# Patient Record
Sex: MALE | Race: BLACK OR AFRICAN AMERICAN | Employment: UNEMPLOYED | URBAN - METROPOLITAN AREA
[De-identification: names, ages, dates, MRNs, and addresses within clinical notes are randomized per-mention and may not be internally consistent; named-entity substitution may affect disease eponyms.]

---

## 2017-01-05 ENCOUNTER — OFFICE VISIT (OUTPATIENT)
Dept: INTERNAL MEDICINE CLINIC | Facility: CLINIC | Age: 52
End: 2017-01-05

## 2017-01-05 VITALS
BODY MASS INDEX: 36.67 KG/M2 | DIASTOLIC BLOOD PRESSURE: 92 MMHG | RESPIRATION RATE: 22 BRPM | HEART RATE: 78 BPM | TEMPERATURE: 98 F | WEIGHT: 267.81 LBS | HEIGHT: 71.75 IN | SYSTOLIC BLOOD PRESSURE: 166 MMHG | OXYGEN SATURATION: 98 %

## 2017-01-05 DIAGNOSIS — I48.91 ATRIAL FIBRILLATION WITH RVR (HCC): ICD-10-CM

## 2017-01-05 DIAGNOSIS — I50.31 CHF (CONGESTIVE HEART FAILURE), NYHA CLASS I, ACUTE, DIASTOLIC (HCC): ICD-10-CM

## 2017-01-05 DIAGNOSIS — I27.20 PULMONARY HTN (HCC): Primary | ICD-10-CM

## 2017-01-05 DIAGNOSIS — I10 ESSENTIAL HYPERTENSION: ICD-10-CM

## 2017-01-05 PROCEDURE — 99213 OFFICE O/P EST LOW 20 MIN: CPT | Performed by: INTERNAL MEDICINE

## 2017-01-05 PROCEDURE — 99215 OFFICE O/P EST HI 40 MIN: CPT | Performed by: INTERNAL MEDICINE

## 2017-01-05 RX ORDER — POTASSIUM CHLORIDE 20 MEQ/1
TABLET, EXTENDED RELEASE ORAL
Refills: 1 | COMMUNITY
Start: 2016-12-28 | End: 2017-02-01

## 2017-01-05 RX ORDER — DILTIAZEM HYDROCHLORIDE 360 MG/1
360 CAPSULE, EXTENDED RELEASE ORAL DAILY
Qty: 30 CAPSULE | Refills: 3 | Status: SHIPPED | OUTPATIENT
Start: 2017-01-05 | End: 2017-02-01

## 2017-01-06 NOTE — ASSESSMENT & PLAN NOTE
Continues to be in atrial fibrillation but rate is controlled. Continue on the Coreg, diltiazem as well as Xarelto. Bleeding precautions discussed. Advised to complete an EKG for a follow-up evaluation.

## 2017-01-06 NOTE — ASSESSMENT & PLAN NOTE
Blood pressure 166/92, pulse 78, temperature 98.1 °F (36.7 °C), temperature source Oral, resp. rate 22, height 5' 11.75\" (1.822 m), weight 267 lb 12.8 oz (121.473 kg), SpO2 98 %. The blood pressure remains quite elevated.   Rechecked and was about the [facility]

## 2017-01-06 NOTE — PATIENT INSTRUCTIONS
Problem List Items Addressed This Visit        Unprioritized    Atrial fibrillation with RVR ([address]     Continues to be in atrial fibrillation but rate is controlled. Continue on the Coreg, diltiazem as well as Xarelto. Bleeding precautions discussed.   Ad mg 1-1/2 tablets at bedtime, Coreg at 25 mg 2 times daily and Lasix at 40 mg once daily. Recent renal functions were stable–EGFR at 60 but creatinine gradually rising at 1.32.   Patient has been advised to drink fluids to keep well hydrated as he does work

## 2017-01-06 NOTE — ASSESSMENT & PLAN NOTE
Recent admission to the hospital with lower extremity edema–was found to have atrial fibrillation with rapid ventricular rhythm. He was stabilized on diltiazem and Coreg. Blood pressures continue to be elevated and is being treated at this time.   He has

## 2017-01-06 NOTE — ASSESSMENT & PLAN NOTE
Pulmonary pressures checked in the hospital at this visit was at 46. This is quite elevated compared to the last test that we have had in the past when it was at about 40. Patient does use a CPAP.   Current elevation was most likely secondary to congestiv

## 2017-01-06 NOTE — PROGRESS NOTES
HPI:    Patient ID: Kacy Hernandez is a 49year old male.     HPI Comments: Per chf clinic    CARE TRANSITION SUMMARY NOTE:  Patient was admitted to the hospital on 11/25/2016 and discharged 11/26/2016.      While hospitalized consults included Cardiology mouth daily for hypertension and for heart rate control, Lasix 40 mg by mouth daily for fluid volume management, and multivitamin 1 tablet by mouth daily as a supplement.      Dictated By Joel Mares.  [address], APN  d:     11/29/2016 10:36:18  t:      11/29/201 exposure. Past treatments include ACE inhibitors, beta blockers, diuretics and lifestyle changes. The current treatment provides moderate improvement. Compliance problems include diet and exercise.   Hypertensive end-organ damage includes heart failure and tablet (25 mg total) by mouth 2 (two) times daily with meals. Disp: 60 tablet Rfl: 0   furosemide 40 MG Oral Tab Take 1 tablet (40 mg total) by mouth daily. Disp: 30 tablet Rfl: 0   lisinopril 10 MG Oral Tab Take 1.5 tablets (15 mg total) by mouth nightly. present. Left: No inguinal adenopathy present. Neurological: He is alert and oriented to person, place, and time. He has normal reflexes. Skin: Skin is warm and dry. Nursing note and vitals reviewed.              ASSESSMENT/PLAN:   Pulmonary ht medications. Consider quitting smoking as soon as is possible. HTN (hypertension)     Blood pressure 166/92, pulse 78, temperature 98.1 °F (36.7 °C), temperature source Oral, resp.  rate 22, height 5' 11.75\" (1.822 m), weight 267 lb 12.8 oz (121.4 Sig: Take 1 capsule (360 mg total) by mouth daily.            Imaging & Referrals:  EKG 12-LEAD       [id number]

## 2017-01-09 ENCOUNTER — OFFICE VISIT (OUTPATIENT)
Dept: CARDIOLOGY CLINIC | Facility: HOSPITAL | Age: 52
End: 2017-01-09
Attending: INTERNAL MEDICINE
Payer: COMMERCIAL

## 2017-01-09 VITALS
RESPIRATION RATE: 17 BRPM | BODY MASS INDEX: 37 KG/M2 | WEIGHT: 272 LBS | OXYGEN SATURATION: 98 % | DIASTOLIC BLOOD PRESSURE: 118 MMHG | SYSTOLIC BLOOD PRESSURE: 182 MMHG | HEART RATE: 82 BPM

## 2017-01-09 DIAGNOSIS — I50.9 CONGESTIVE HEART FAILURE, UNSPECIFIED: Primary | ICD-10-CM

## 2017-01-09 LAB
ANION GAP SERPL CALC-SCNC: 9 MMOL/L (ref 0–18)
BUN SERPL-MCNC: 17 MG/DL (ref 8–20)
BUN/CREAT SERPL: 12.3 (ref 10–20)
CALCIUM SERPL-MCNC: 9.4 MG/DL (ref 8.5–10.5)
CHLORIDE SERPL-SCNC: 108 MMOL/L (ref 95–110)
CO2 SERPL-SCNC: 26 MMOL/L (ref 22–32)
CREAT SERPL-MCNC: 1.38 MG/DL (ref 0.5–1.5)
GLUCOSE SERPL-MCNC: 119 MG/DL (ref 70–99)
OSMOLALITY UR CALC.SUM OF ELEC: 299 MOSM/KG (ref 275–295)
POTASSIUM SERPL-SCNC: 4.1 MMOL/L (ref 3.3–5.1)
SODIUM SERPL-SCNC: 143 MMOL/L (ref 136–144)

## 2017-01-09 PROCEDURE — 36415 COLL VENOUS BLD VENIPUNCTURE: CPT | Performed by: NURSE PRACTITIONER

## 2017-01-09 PROCEDURE — 99211 OFF/OP EST MAY X REQ PHY/QHP: CPT | Performed by: NURSE PRACTITIONER

## 2017-01-09 PROCEDURE — 80048 BASIC METABOLIC PNL TOTAL CA: CPT | Performed by: NURSE PRACTITIONER

## 2017-01-09 PROCEDURE — 99213 OFFICE O/P EST LOW 20 MIN: CPT | Performed by: NURSE PRACTITIONER

## 2017-01-09 NOTE — PROGRESS NOTES
Heart Failure [facility]. Patient Status:  Outpatient    1965 MRN [id number]   Location MD Aimee Gabriel MD Hetty Ao is a 49year old history of HTN nightly. No CP. No dizziness. No syncope. No palpitations. Denies peripheral edema. Last 3 days the patient has felt a little bloated. Patient reports doing more home cooking and watching his Na intake better. Patient feels he is at around 64 fl oz daily.  H AM   TROP 0.03 11/25/2016 07:35 AM       General appearance: alert, appears stated age and cooperative  Neck: no JVD and supple, symmetrical, trachea midline  Lungs: mildly diminished throughout. Clear.    Chest wall: no tenderness  Heart: S1, S2 normal, ir Eusebio Wall [phone]     Things for You to Remember:     -Please weigh yourself daily in the morning before breakfast and call with 3 lb weight gain overnight or 5 lb weight gain in 1 week. -Eat low salt foods- a maximum sodium intake of 2000 mg daily.  C

## 2017-01-09 NOTE — PATIENT INSTRUCTIONS
Continue all of your present medications.  Please call in the future if needed    Please make appointment with Dr Eduarda Horowitz [phone]     Things for You to Remember:     -Please weigh yourself daily in the morning before breakfast and call with 3 lb weight

## 2017-01-31 NOTE — TELEPHONE ENCOUNTER
[address], [phone], [phone]    Current Outpatient Prescriptions:  Potassium Chloride ER 20 MEQ Oral Tab CR TK 1 T PO  D Disp:  Rfl: 1   DiltiaZEM HCl ER Beads 360

## 2017-02-01 RX ORDER — DILTIAZEM HYDROCHLORIDE 360 MG/1
360 CAPSULE, EXTENDED RELEASE ORAL DAILY
Qty: 30 CAPSULE | Refills: 5 | Status: SHIPPED | OUTPATIENT
Start: 2017-02-01 | End: 2018-03-03

## 2017-02-01 RX ORDER — LISINOPRIL 10 MG/1
15 TABLET ORAL NIGHTLY
Qty: 60 TABLET | Refills: 5 | Status: SHIPPED | OUTPATIENT
Start: 2017-02-01 | End: 2017-02-02

## 2017-02-01 RX ORDER — LISINOPRIL 10 MG/1
15 TABLET ORAL DAILY
COMMUNITY
End: 2017-02-01

## 2017-02-01 RX ORDER — CARVEDILOL 25 MG/1
25 TABLET ORAL 2 TIMES DAILY WITH MEALS
Qty: 60 TABLET | Refills: 5 | Status: SHIPPED | OUTPATIENT
Start: 2017-02-01 | End: 2018-01-27

## 2017-02-01 RX ORDER — POTASSIUM CHLORIDE 20 MEQ/1
TABLET, EXTENDED RELEASE ORAL
Qty: 30 TABLET | Refills: 5 | Status: SHIPPED | OUTPATIENT
Start: 2017-02-01 | End: 2017-10-09

## 2017-02-01 RX ORDER — FUROSEMIDE 40 MG/1
40 TABLET ORAL DAILY
Qty: 30 TABLET | Refills: 5 | Status: SHIPPED | OUTPATIENT
Start: 2017-02-01 | End: 2018-04-12

## 2017-02-01 NOTE — TELEPHONE ENCOUNTER
RECEIVED VERBAL ORDER FROM DR SEEMA GUERRERO TO REFILL MEDICATIONS, REFILLS SENT TO PHARMACY. APPOINTMENT SCHEDULED WITH DR STEPHENSON TOMORROW.

## 2017-02-01 NOTE — TELEPHONE ENCOUNTER
PATIENT CALLED STATES THAT HE IS COMPLETELY OUT OF MEDICATION SINCE FRIDAY AND THAT HE IS AT THE PHARMACY NOW AND WOULD LIKE MEDS REFILLED. PHONE # [phone]. LOV 1/5/17. PLEASE ADVISE.

## 2017-02-02 ENCOUNTER — OFFICE VISIT (OUTPATIENT)
Dept: INTERNAL MEDICINE CLINIC | Facility: CLINIC | Age: 52
End: 2017-02-02

## 2017-02-02 VITALS
RESPIRATION RATE: 20 BRPM | OXYGEN SATURATION: 97 % | SYSTOLIC BLOOD PRESSURE: 159 MMHG | BODY MASS INDEX: 38.41 KG/M2 | WEIGHT: 280.5 LBS | HEIGHT: 71.75 IN | DIASTOLIC BLOOD PRESSURE: 96 MMHG | HEART RATE: 83 BPM | TEMPERATURE: 98 F

## 2017-02-02 DIAGNOSIS — I48.91 ATRIAL FIBRILLATION WITH RVR (HCC): ICD-10-CM

## 2017-02-02 DIAGNOSIS — I10 ESSENTIAL HYPERTENSION: Primary | ICD-10-CM

## 2017-02-02 PROCEDURE — 99214 OFFICE O/P EST MOD 30 MIN: CPT | Performed by: INTERNAL MEDICINE

## 2017-02-02 PROCEDURE — 99212 OFFICE O/P EST SF 10 MIN: CPT | Performed by: INTERNAL MEDICINE

## 2017-02-02 RX ORDER — LOSARTAN POTASSIUM 100 MG/1
TABLET ORAL
Qty: 30 TABLET | Refills: 5 | Status: SHIPPED | OUTPATIENT
Start: 2017-02-02 | End: 2018-01-27

## 2017-02-03 NOTE — PATIENT INSTRUCTIONS
Problem List Items Addressed This Visit        Unprioritized    Atrial fibrillation with RVR ([facility])     Blood pressure 159/96, pulse 83, temperature 98.4 °F (36.9 °C), temperature source Oral, resp.  rate 20, height 5' 11.75\" (1.822 m), weight 280 lb 8 oz (

## 2017-02-18 NOTE — PROGRESS NOTES
HPI:    Patient ID: Dawood Herrera is a 49year old male. Hypertension  This is a chronic problem. The current episode started more than 1 year ago. The problem has been waxing and waning since onset. The problem is uncontrolled.  Pertinent negatives i Genitourinary: Negative. Musculoskeletal: Negative. Skin: Negative. Neurological: Negative. Psychiatric/Behavioral: Negative.              Current Outpatient Prescriptions:  losartan 100 MG Oral Tab 1 tab po q d Disp: 30 tablet Rfl: 5   furose diagnosis)  Atrial fibrillation with rvr (hcc)    Problem List Items Addressed This Visit        Unprioritized    Atrial fibrillation with RVR (HCC)     Blood pressure 159/96, pulse 83, temperature 98.4 °F (36.9 °C), temperature source Oral, resp.  rate 20, tablet 5      Si tab po q d           Imaging & Referrals:  None       [id number]

## 2017-10-09 RX ORDER — POTASSIUM CHLORIDE 20 MEQ/1
TABLET, EXTENDED RELEASE ORAL
Qty: 30 TABLET | Refills: 0 | Status: SHIPPED | OUTPATIENT
Start: 2017-10-09 | End: 2018-01-27

## 2018-01-30 RX ORDER — LOSARTAN POTASSIUM 100 MG/1
TABLET ORAL
Qty: 30 TABLET | Refills: 5 | Status: SHIPPED | OUTPATIENT
Start: 2018-01-30 | End: 2018-04-12

## 2018-01-30 RX ORDER — CARVEDILOL 25 MG/1
TABLET ORAL
Qty: 60 TABLET | Refills: 5 | Status: SHIPPED | OUTPATIENT
Start: 2018-01-30 | End: 2018-04-12

## 2018-01-30 RX ORDER — POTASSIUM CHLORIDE 20 MEQ/1
TABLET, EXTENDED RELEASE ORAL
Qty: 30 TABLET | Refills: 5 | Status: SHIPPED | OUTPATIENT
Start: 2018-01-30 | End: 2018-04-12

## 2018-02-03 RX ORDER — RIVAROXABAN 20 MG/1
TABLET, FILM COATED ORAL
Qty: 30 TABLET | Refills: 5 | Status: ON HOLD | OUTPATIENT
Start: 2018-02-03 | End: 2019-04-02

## 2018-03-04 RX ORDER — DILTIAZEM HYDROCHLORIDE 360 MG/1
CAPSULE, EXTENDED RELEASE ORAL
Qty: 30 CAPSULE | Refills: 0 | Status: SHIPPED | OUTPATIENT
Start: 2018-03-04 | End: 2018-04-12

## 2018-03-04 NOTE — TELEPHONE ENCOUNTER
Please call and schedule an appt olga lidia.please adv to complete labs before visit-cmp,lipid panel

## 2018-04-12 ENCOUNTER — TELEPHONE (OUTPATIENT)
Dept: INTERNAL MEDICINE CLINIC | Facility: CLINIC | Age: 53
End: 2018-04-12

## 2018-04-12 ENCOUNTER — OFFICE VISIT (OUTPATIENT)
Dept: INTERNAL MEDICINE CLINIC | Facility: CLINIC | Age: 53
End: 2018-04-12

## 2018-04-12 VITALS
DIASTOLIC BLOOD PRESSURE: 90 MMHG | BODY MASS INDEX: 37.51 KG/M2 | HEART RATE: 121 BPM | WEIGHT: 283 LBS | SYSTOLIC BLOOD PRESSURE: 160 MMHG | HEIGHT: 73 IN | TEMPERATURE: 98 F

## 2018-04-12 DIAGNOSIS — I10 ESSENTIAL HYPERTENSION: ICD-10-CM

## 2018-04-12 DIAGNOSIS — I48.91 ATRIAL FIBRILLATION WITH RVR (HCC): Primary | ICD-10-CM

## 2018-04-12 PROCEDURE — 99214 OFFICE O/P EST MOD 30 MIN: CPT | Performed by: INTERNAL MEDICINE

## 2018-04-12 PROCEDURE — 99212 OFFICE O/P EST SF 10 MIN: CPT | Performed by: INTERNAL MEDICINE

## 2018-04-12 RX ORDER — LOSARTAN POTASSIUM 100 MG/1
100 TABLET ORAL
Qty: 30 TABLET | Refills: 5 | Status: ON HOLD | OUTPATIENT
Start: 2018-04-12 | End: 2019-04-02

## 2018-04-12 RX ORDER — DILTIAZEM HYDROCHLORIDE 360 MG/1
CAPSULE, EXTENDED RELEASE ORAL
Qty: 30 CAPSULE | Refills: 5 | Status: ON HOLD | OUTPATIENT
Start: 2018-04-12 | End: 2019-04-02

## 2018-04-12 RX ORDER — CARVEDILOL 25 MG/1
TABLET ORAL
Qty: 60 TABLET | Refills: 5 | Status: ON HOLD | OUTPATIENT
Start: 2018-04-12 | End: 2019-04-02

## 2018-04-12 RX ORDER — FUROSEMIDE 40 MG/1
40 TABLET ORAL DAILY
Qty: 30 TABLET | Refills: 5 | Status: ON HOLD | OUTPATIENT
Start: 2018-04-12 | End: 2019-04-02

## 2018-04-12 RX ORDER — POTASSIUM CHLORIDE 20 MEQ/1
20 TABLET, EXTENDED RELEASE ORAL
Qty: 30 TABLET | Refills: 5 | Status: ON HOLD | OUTPATIENT
Start: 2018-04-12 | End: 2019-04-02

## 2018-04-13 NOTE — ASSESSMENT & PLAN NOTE
Blood pressure 160/90, pulse 121, temperature 98.1 °F (36.7 °C), temperature source Oral, height 6' 1\" (1.854 m), weight 283 lb (128.4 kg). Blood pressures remain elevated even upon recheck.   Restart on diltiazem at 360 mg 1 capsule once daily, Coreg 25

## 2018-04-13 NOTE — ASSESSMENT & PLAN NOTE
Atrial fibrillation with rapid ventricular rate. Blood pressures remain elevated. Patient has not been taking his blood pressure medications as he ran out of the medications. Refills have been provided.   Patient is advised to complete an EKG prior to th

## 2018-04-13 NOTE — PROGRESS NOTES
HPI:    Patient ID: Sol Ren is a 47year old male.     Pt has been noncompliant with f/u and labs  Patient discontinued the Xarelto about 2 months back.        Unprioritized    Atrial fibrillation with RVR ([facility])        Blood pressure 159/96, pulse anxiety, chest pain, malaise/fatigue, orthopnea, palpitations, peripheral edema, PND, shortness of breath or sweats. There are no associated agents to hypertension.  Risk factors for coronary artery disease include obesity, male gender and smoking/tobacco e Tab TAKE 1 TABLET(25 MG) BY MOUTH TWICE DAILY WITH MEALS Disp: 60 tablet Rfl: 5   losartan 100 MG Oral Tab Take 1 tablet (100 mg total) by mouth once daily.  Disp: 30 tablet Rfl: 5   Potassium Chloride ER 20 MEQ Oral Tab CR Take 1 tablet (20 mEq total) by m complete an EKG prior to the next office visit. He has not been taking the Xarelto because of the prohibitive cost.  Coupon has been provided today and will follow-up in the next 2-3 weeks.          Relevant Orders    CARD ECHO 2D DOPPLER (CPT=93306)    EK losartan 100 MG Oral Tab 30 tablet 5      Sig: Take 1 tablet (100 mg total) by mouth once daily. Potassium Chloride ER 20 MEQ Oral Tab CR 30 tablet 5      Sig: Take 1 tablet (20 mEq total) by mouth once daily.            Imaging & Referrals:  EKG 12-LE

## 2018-04-13 NOTE — PATIENT INSTRUCTIONS
Problem List Items Addressed This Visit        Unprioritized    Atrial fibrillation with RVR ([address] - Primary     Atrial fibrillation with rapid ventricular rate. Blood pressures remain elevated.   Patient has not been taking his blood pressure medications

## 2018-05-05 ENCOUNTER — HOSPITAL ENCOUNTER (OUTPATIENT)
Dept: CV DIAGNOSTICS | Facility: HOSPITAL | Age: 53
Discharge: HOME | End: 2018-05-05
Attending: INTERNAL MEDICINE
Payer: COMMERCIAL

## 2018-05-05 DIAGNOSIS — I48.91 ATRIAL FIBRILLATION WITH RVR (HCC): ICD-10-CM

## 2018-05-05 DIAGNOSIS — I10 ESSENTIAL HYPERTENSION: ICD-10-CM

## 2018-05-05 PROCEDURE — 93306 TTE W/DOPPLER COMPLETE: CPT | Performed by: INTERNAL MEDICINE

## 2018-05-06 ENCOUNTER — LAB ENCOUNTER (OUTPATIENT)
Dept: LAB | Facility: HOSPITAL | Age: 53
End: 2018-05-06
Attending: INTERNAL MEDICINE
Payer: COMMERCIAL

## 2018-05-06 DIAGNOSIS — I10 ESSENTIAL HYPERTENSION: ICD-10-CM

## 2018-05-06 PROCEDURE — 82570 ASSAY OF URINE CREATININE: CPT

## 2018-05-06 PROCEDURE — 80061 LIPID PANEL: CPT

## 2018-05-06 PROCEDURE — 80053 COMPREHEN METABOLIC PANEL: CPT

## 2018-05-06 PROCEDURE — 85025 COMPLETE CBC W/AUTO DIFF WBC: CPT

## 2018-05-06 PROCEDURE — 84443 ASSAY THYROID STIM HORMONE: CPT

## 2018-05-06 PROCEDURE — 82043 UR ALBUMIN QUANTITATIVE: CPT

## 2018-05-06 PROCEDURE — 36415 COLL VENOUS BLD VENIPUNCTURE: CPT

## 2018-05-06 PROCEDURE — 81001 URINALYSIS AUTO W/SCOPE: CPT

## 2018-07-09 NOTE — ASSESSMENT & PLAN NOTE
Blood pressure 159/96, pulse 83, temperature 98.4 °F (36.9 °C), temperature source Oral, resp. rate 20, height 5' 11.75\" (1.822 m), weight 280 lb 8 oz (127.234 kg), SpO2 97 %.    Patient is currently on diltiazem ER at 360 mg 1 capsule once daily, lisinopr
Blood pressure 159/96, pulse 83, temperature 98.4 °F (36.9 °C), temperature source Oral, resp. rate 20, height 5' 11.75\" (1.822 m), weight 280 lb 8 oz (127.234 kg), SpO2 97 %. Heart rate seems stable at this time.   Blood pressures are elevated and medic
Ambulatory

## 2019-02-15 ENCOUNTER — APPOINTMENT (OUTPATIENT)
Dept: ULTRASOUND IMAGING | Facility: HOSPITAL | Age: 54
End: 2019-02-15
Payer: COMMERCIAL

## 2019-02-15 ENCOUNTER — OFFICE VISIT (OUTPATIENT)
Dept: INTERNAL MEDICINE CLINIC | Facility: CLINIC | Age: 54
End: 2019-02-15
Payer: COMMERCIAL

## 2019-02-15 ENCOUNTER — HOSPITAL ENCOUNTER (EMERGENCY)
Facility: HOSPITAL | Age: 54
Discharge: HOME | End: 2019-02-15
Attending: EMERGENCY MEDICINE
Payer: COMMERCIAL

## 2019-02-15 VITALS
OXYGEN SATURATION: 96 % | DIASTOLIC BLOOD PRESSURE: 98 MMHG | SYSTOLIC BLOOD PRESSURE: 142 MMHG | TEMPERATURE: 99 F | WEIGHT: 280 LBS | HEIGHT: 73 IN | HEART RATE: 74 BPM | BODY MASS INDEX: 37.11 KG/M2 | RESPIRATION RATE: 22 BRPM

## 2019-02-15 VITALS
DIASTOLIC BLOOD PRESSURE: 75 MMHG | HEART RATE: 101 BPM | RESPIRATION RATE: 18 BRPM | WEIGHT: 282 LBS | HEIGHT: 73 IN | SYSTOLIC BLOOD PRESSURE: 155 MMHG | BODY MASS INDEX: 37.37 KG/M2

## 2019-02-15 DIAGNOSIS — L03.115 CELLULITIS OF RIGHT LEG: Primary | ICD-10-CM

## 2019-02-15 DIAGNOSIS — L03.115 CELLULITIS OF RIGHT LOWER EXTREMITY: Primary | ICD-10-CM

## 2019-02-15 LAB
ANION GAP SERPL CALC-SCNC: 7 MMOL/L (ref 0–18)
BASOPHILS # BLD AUTO: 0.06 X10(3) UL (ref 0–0.2)
BASOPHILS NFR BLD AUTO: 0.4 %
BUN BLD-MCNC: 17 MG/DL (ref 7–18)
BUN/CREAT SERPL: 12.3 (ref 10–20)
CALCIUM BLD-MCNC: 8.4 MG/DL (ref 8.5–10.1)
CHLORIDE SERPL-SCNC: 106 MMOL/L (ref 98–107)
CO2 SERPL-SCNC: 26 MMOL/L (ref 21–32)
CREAT BLD-MCNC: 1.38 MG/DL (ref 0.7–1.3)
DEPRECATED RDW RBC AUTO: 45.3 FL (ref 35.1–46.3)
EOSINOPHIL # BLD AUTO: 0.12 X10(3) UL (ref 0–0.7)
EOSINOPHIL NFR BLD AUTO: 0.8 %
ERYTHROCYTE [DISTWIDTH] IN BLOOD BY AUTOMATED COUNT: 14 % (ref 11–15)
GLUCOSE BLD-MCNC: 133 MG/DL (ref 70–99)
HCT VFR BLD AUTO: 38.8 % (ref 39–53)
HGB BLD-MCNC: 12.1 G/DL (ref 13–17.5)
IMM GRANULOCYTES # BLD AUTO: 0.07 X10(3) UL (ref 0–1)
IMM GRANULOCYTES NFR BLD: 0.5 %
LYMPHOCYTES # BLD AUTO: 1.94 X10(3) UL (ref 1–4)
LYMPHOCYTES NFR BLD AUTO: 13.2 %
MCH RBC QN AUTO: 27.6 PG (ref 26–34)
MCHC RBC AUTO-ENTMCNC: 31.2 G/DL (ref 31–37)
MCV RBC AUTO: 88.4 FL (ref 80–100)
MONOCYTES # BLD AUTO: 1.57 X10(3) UL (ref 0.1–1)
MONOCYTES NFR BLD AUTO: 10.7 %
NEUTROPHILS # BLD AUTO: 10.96 X10 (3) UL (ref 1.5–7.7)
NEUTROPHILS # BLD AUTO: 10.96 X10(3) UL (ref 1.5–7.7)
NEUTROPHILS NFR BLD AUTO: 74.4 %
OSMOLALITY SERPL CALC.SUM OF ELEC: 291 MOSM/KG (ref 275–295)
PLATELET # BLD AUTO: 200 10(3)UL (ref 150–450)
POTASSIUM SERPL-SCNC: 4.1 MMOL/L (ref 3.5–5.1)
RBC # BLD AUTO: 4.39 X10(6)UL (ref 4.3–5.7)
SODIUM SERPL-SCNC: 139 MMOL/L (ref 136–145)
WBC # BLD AUTO: 14.7 X10(3) UL (ref 4–11)

## 2019-02-15 PROCEDURE — 99214 OFFICE O/P EST MOD 30 MIN: CPT | Performed by: INTERNAL MEDICINE

## 2019-02-15 PROCEDURE — 85025 COMPLETE CBC W/AUTO DIFF WBC: CPT | Performed by: EMERGENCY MEDICINE

## 2019-02-15 PROCEDURE — 99284 EMERGENCY DEPT VISIT MOD MDM: CPT

## 2019-02-15 PROCEDURE — 99212 OFFICE O/P EST SF 10 MIN: CPT | Performed by: INTERNAL MEDICINE

## 2019-02-15 PROCEDURE — 93971 EXTREMITY STUDY: CPT | Performed by: EMERGENCY MEDICINE

## 2019-02-15 PROCEDURE — 36415 COLL VENOUS BLD VENIPUNCTURE: CPT

## 2019-02-15 PROCEDURE — 80048 BASIC METABOLIC PNL TOTAL CA: CPT | Performed by: EMERGENCY MEDICINE

## 2019-02-15 RX ORDER — CEPHALEXIN 500 MG/1
500 CAPSULE ORAL 4 TIMES DAILY
Qty: 12 CAPSULE | Refills: 0 | Status: SHIPPED | OUTPATIENT
Start: 2019-02-15 | End: 2019-02-18

## 2019-02-15 RX ORDER — HYDROCODONE BITARTRATE AND ACETAMINOPHEN 5; 325 MG/1; MG/1
1-2 TABLET ORAL EVERY 4 HOURS PRN
Qty: 10 TABLET | Refills: 0 | Status: SHIPPED | OUTPATIENT
Start: 2019-02-15 | End: 2019-02-22

## 2019-02-15 NOTE — ED INITIAL ASSESSMENT (HPI)
Atraumatic RLE pain, redness and swelling x2-3 days - warm to touch. Referred to to ER from Dr Tez Bo office for r/o DVT.

## 2019-02-15 NOTE — ED PROVIDER NOTES
Patient Seen in: [facility] AND [facility] Emergency Department    History   Patient presents with:  Swelling Edema (cardiovascular, metabolic)    Stated Complaint: sent down by  for DVT test    HPI    Mandi Escobar is a 56-year-old male who presents to the emergenc Constitutional: He is oriented to person, place, and time. He appears well-developed and well-nourished. HENT:   Head: Normocephalic and atraumatic. Eyes: Conjunctivae and EOM are normal. Pupils are equal, round, and reactive to light.    Neck: Neck sup Ultrasound negative for DVT. Suspect slowly resolving cellulitis. Patient was placed on Keflex and at this time is afebrile. Will apply compression hose and prescribed pain medication.   Patient to follow-up with primary care physician            [place]

## 2019-02-15 NOTE — PROGRESS NOTES
HPI:    Patient ID: Bhumika Pierce is a 47year old male. Pt noncompliant with follow up    Last echo  Study Conclusions  1. Left ventricle: The cavity size was normal. There was severe     concentric hypertrophy.  Systolic function was normal. The Outpatient Medications:  DilTIAZem HCl ER Beads 360 MG Oral Capsule SR 24 Hr TAKE 1 CAPSULE(360 MG) BY MOUTH DAILY Disp: 30 capsule Rfl: 5   furosemide 40 MG Oral Tab Take 1 tablet (40 mg total) by mouth daily.  Disp: 30 tablet Rfl: 5   carvedilol 25 MG Ora the way from the ankle into the posterior aspect of the calf and along the medial margin of the right lower extremity. ). Any movement in the ankle is painful. Lymphadenopathy:     He has no cervical adenopathy.    Neurological: He is alert and oriented

## 2019-02-15 NOTE — PATIENT INSTRUCTIONS
Problem List Items Addressed This Visit        Unprioritized    Cellulitis of right lower extremity - Primary     Patient has noticed painful swelling in his right lower extremity without any history of recent injury.   On evaluation he has red tender swell

## 2019-02-15 NOTE — ASSESSMENT & PLAN NOTE
Patient has noticed painful swelling in his right lower extremity without any history of recent injury. On evaluation he has red tender swelling that extends all the way up into the calf. Patient does have multiple pre-existing comorbid conditions.   He i

## 2019-03-31 ENCOUNTER — HOSPITAL ENCOUNTER (INPATIENT)
Facility: HOSPITAL | Age: 54
LOS: 1 days | Discharge: HOME | DRG: 202 | End: 2019-04-02
Attending: EMERGENCY MEDICINE | Admitting: HOSPITALIST
Payer: COMMERCIAL

## 2019-03-31 ENCOUNTER — APPOINTMENT (OUTPATIENT)
Dept: GENERAL RADIOLOGY | Facility: HOSPITAL | Age: 54
DRG: 202 | End: 2019-03-31
Attending: EMERGENCY MEDICINE
Payer: COMMERCIAL

## 2019-03-31 DIAGNOSIS — I10 ESSENTIAL HYPERTENSION: ICD-10-CM

## 2019-03-31 DIAGNOSIS — R06.00 DYSPNEA ON EXERTION: Primary | ICD-10-CM

## 2019-03-31 DIAGNOSIS — R77.8 ELEVATED TROPONIN: ICD-10-CM

## 2019-03-31 PROCEDURE — 71045 X-RAY EXAM CHEST 1 VIEW: CPT | Performed by: EMERGENCY MEDICINE

## 2019-03-31 RX ORDER — ACETAMINOPHEN 500 MG
1000 TABLET ORAL ONCE
Status: COMPLETED | OUTPATIENT
Start: 2019-03-31 | End: 2019-03-31

## 2019-03-31 RX ORDER — IPRATROPIUM BROMIDE AND ALBUTEROL SULFATE 2.5; .5 MG/3ML; MG/3ML
3 SOLUTION RESPIRATORY (INHALATION) ONCE
Status: COMPLETED | OUTPATIENT
Start: 2019-03-31 | End: 2019-03-31

## 2019-04-01 ENCOUNTER — APPOINTMENT (OUTPATIENT)
Dept: CV DIAGNOSTICS | Facility: HOSPITAL | Age: 54
DRG: 202 | End: 2019-04-01
Attending: HOSPITALIST
Payer: COMMERCIAL

## 2019-04-01 PROBLEM — R06.09 DYSPNEA ON EXERTION: Status: ACTIVE | Noted: 2019-04-01

## 2019-04-01 PROBLEM — R79.89 ELEVATED TROPONIN: Status: ACTIVE | Noted: 2019-04-01

## 2019-04-01 PROBLEM — R77.8 ELEVATED TROPONIN: Status: ACTIVE | Noted: 2019-04-01

## 2019-04-01 PROBLEM — R06.00 DYSPNEA ON EXERTION: Status: ACTIVE | Noted: 2019-04-01

## 2019-04-01 PROCEDURE — 5A09357 ASSISTANCE WITH RESPIRATORY VENTILATION, LESS THAN 24 CONSECUTIVE HOURS, CONTINUOUS POSITIVE AIRWAY PRESSURE: ICD-10-PCS | Performed by: HOSPITALIST

## 2019-04-01 PROCEDURE — 99223 1ST HOSP IP/OBS HIGH 75: CPT | Performed by: HOSPITALIST

## 2019-04-01 PROCEDURE — 93306 TTE W/DOPPLER COMPLETE: CPT | Performed by: HOSPITALIST

## 2019-04-01 RX ORDER — LOSARTAN POTASSIUM 50 MG/1
50 TABLET ORAL
Status: DISCONTINUED | OUTPATIENT
Start: 2019-04-01 | End: 2019-04-01

## 2019-04-01 RX ORDER — DIPHENHYDRAMINE HCL 25 MG
25 CAPSULE ORAL NIGHTLY PRN
Status: DISCONTINUED | OUTPATIENT
Start: 2019-04-01 | End: 2019-04-02

## 2019-04-01 RX ORDER — IPRATROPIUM BROMIDE AND ALBUTEROL SULFATE 2.5; .5 MG/3ML; MG/3ML
3 SOLUTION RESPIRATORY (INHALATION) ONCE
Status: COMPLETED | OUTPATIENT
Start: 2019-04-01 | End: 2019-04-01

## 2019-04-01 RX ORDER — HEPARIN SODIUM 5000 [USP'U]/ML
5000 INJECTION, SOLUTION INTRAVENOUS; SUBCUTANEOUS EVERY 12 HOURS SCHEDULED
Status: DISCONTINUED | OUTPATIENT
Start: 2019-04-01 | End: 2019-04-01

## 2019-04-01 RX ORDER — IPRATROPIUM BROMIDE AND ALBUTEROL SULFATE 2.5; .5 MG/3ML; MG/3ML
3 SOLUTION RESPIRATORY (INHALATION)
Status: DISCONTINUED | OUTPATIENT
Start: 2019-04-01 | End: 2019-04-02

## 2019-04-01 RX ORDER — SODIUM CHLORIDE 0.9 % (FLUSH) 0.9 %
3 SYRINGE (ML) INJECTION AS NEEDED
Status: DISCONTINUED | OUTPATIENT
Start: 2019-04-01 | End: 2019-04-02

## 2019-04-01 RX ORDER — NITROGLYCERIN 0.4 MG/1
0.4 TABLET SUBLINGUAL EVERY 5 MIN PRN
Status: DISCONTINUED | OUTPATIENT
Start: 2019-04-01 | End: 2019-04-02

## 2019-04-01 RX ORDER — POTASSIUM CHLORIDE 20 MEQ/1
40 TABLET, EXTENDED RELEASE ORAL EVERY 4 HOURS
Status: COMPLETED | OUTPATIENT
Start: 2019-04-01 | End: 2019-04-01

## 2019-04-01 RX ORDER — FUROSEMIDE 10 MG/ML
40 INJECTION INTRAMUSCULAR; INTRAVENOUS ONCE
Status: COMPLETED | OUTPATIENT
Start: 2019-04-01 | End: 2019-04-01

## 2019-04-01 RX ORDER — DOXEPIN HYDROCHLORIDE 50 MG/1
1 CAPSULE ORAL DAILY
Status: DISCONTINUED | OUTPATIENT
Start: 2019-04-01 | End: 2019-04-02

## 2019-04-01 RX ORDER — AZITHROMYCIN 250 MG/1
500 TABLET, FILM COATED ORAL EVERY 24 HOURS
Status: DISCONTINUED | OUTPATIENT
Start: 2019-04-02 | End: 2019-04-02

## 2019-04-01 RX ORDER — SIMETHICONE 80 MG
80 TABLET,CHEWABLE ORAL 4 TIMES DAILY PRN
Status: DISCONTINUED | OUTPATIENT
Start: 2019-04-01 | End: 2019-04-02

## 2019-04-01 RX ORDER — CARVEDILOL 25 MG/1
25 TABLET ORAL 2 TIMES DAILY WITH MEALS
Status: DISCONTINUED | OUTPATIENT
Start: 2019-04-01 | End: 2019-04-02

## 2019-04-01 RX ORDER — ONDANSETRON 2 MG/ML
4 INJECTION INTRAMUSCULAR; INTRAVENOUS EVERY 6 HOURS PRN
Status: DISCONTINUED | OUTPATIENT
Start: 2019-04-01 | End: 2019-04-02

## 2019-04-01 RX ORDER — HYDRALAZINE HYDROCHLORIDE 20 MG/ML
20 INJECTION INTRAMUSCULAR; INTRAVENOUS EVERY 6 HOURS PRN
Status: DISCONTINUED | OUTPATIENT
Start: 2019-04-01 | End: 2019-04-02

## 2019-04-01 RX ORDER — DILTIAZEM HYDROCHLORIDE 360 MG/1
360 CAPSULE, EXTENDED RELEASE ORAL DAILY
Status: DISCONTINUED | OUTPATIENT
Start: 2019-04-01 | End: 2019-04-01

## 2019-04-01 RX ORDER — METHYLPREDNISOLONE SODIUM SUCCINATE 40 MG/ML
40 INJECTION, POWDER, LYOPHILIZED, FOR SOLUTION INTRAMUSCULAR; INTRAVENOUS ONCE
Status: COMPLETED | OUTPATIENT
Start: 2019-04-01 | End: 2019-04-01

## 2019-04-01 RX ORDER — LOSARTAN POTASSIUM 100 MG/1
100 TABLET ORAL
Status: DISCONTINUED | OUTPATIENT
Start: 2019-04-02 | End: 2019-04-02

## 2019-04-01 RX ORDER — DILTIAZEM HYDROCHLORIDE 360 MG/1
360 CAPSULE, EXTENDED RELEASE ORAL DAILY
Status: DISCONTINUED | OUTPATIENT
Start: 2019-04-01 | End: 2019-04-02

## 2019-04-01 RX ORDER — ASPIRIN 81 MG/1
324 TABLET, CHEWABLE ORAL ONCE
Status: COMPLETED | OUTPATIENT
Start: 2019-04-01 | End: 2019-04-01

## 2019-04-01 RX ORDER — ASPIRIN 81 MG/1
81 TABLET, CHEWABLE ORAL DAILY
Status: DISCONTINUED | OUTPATIENT
Start: 2019-04-01 | End: 2019-04-02

## 2019-04-01 RX ORDER — CARVEDILOL 25 MG/1
25 TABLET ORAL 2 TIMES DAILY WITH MEALS
Status: DISCONTINUED | OUTPATIENT
Start: 2019-04-01 | End: 2019-04-01

## 2019-04-01 RX ORDER — LOSARTAN POTASSIUM 100 MG/1
100 TABLET ORAL
Status: DISCONTINUED | OUTPATIENT
Start: 2019-04-01 | End: 2019-04-01

## 2019-04-01 RX ORDER — ACETAMINOPHEN 325 MG/1
650 TABLET ORAL EVERY 6 HOURS PRN
Status: DISCONTINUED | OUTPATIENT
Start: 2019-04-01 | End: 2019-04-02

## 2019-04-01 RX ORDER — LOSARTAN POTASSIUM 50 MG/1
50 TABLET ORAL ONCE
Status: COMPLETED | OUTPATIENT
Start: 2019-04-01 | End: 2019-04-01

## 2019-04-01 RX ORDER — CARVEDILOL 12.5 MG/1
12.5 TABLET ORAL 2 TIMES DAILY WITH MEALS
Status: DISCONTINUED | OUTPATIENT
Start: 2019-04-01 | End: 2019-04-01

## 2019-04-01 RX ORDER — METHYLPREDNISOLONE SODIUM SUCCINATE 40 MG/ML
40 INJECTION, POWDER, LYOPHILIZED, FOR SOLUTION INTRAMUSCULAR; INTRAVENOUS EVERY 8 HOURS
Status: DISCONTINUED | OUTPATIENT
Start: 2019-04-01 | End: 2019-04-02

## 2019-04-01 RX ORDER — CARVEDILOL 12.5 MG/1
12.5 TABLET ORAL ONCE
Status: COMPLETED | OUTPATIENT
Start: 2019-04-01 | End: 2019-04-01

## 2019-04-01 RX ORDER — POTASSIUM CHLORIDE 20 MEQ/1
20 TABLET, EXTENDED RELEASE ORAL
Status: DISCONTINUED | OUTPATIENT
Start: 2019-04-01 | End: 2019-04-02

## 2019-04-01 RX ORDER — FUROSEMIDE 10 MG/ML
40 INJECTION INTRAMUSCULAR; INTRAVENOUS
Status: DISCONTINUED | OUTPATIENT
Start: 2019-04-01 | End: 2019-04-02

## 2019-04-01 NOTE — PROGRESS NOTES
[facility] Pharmacy Note: Route Optimization for Azithromycin [facility])    Patient is currently on Azithromycin (ZITHROMAX) 500 mg IV every 24 hours.    The patient meets the criteria to convert to the oral equivalent as established by the IV to Oral conversion

## 2019-04-01 NOTE — ED PROVIDER NOTES
Patient Seen in: [facility] AND [facility] Emergency Department    History   Patient presents with:  Dyspnea WOJCIECH SOB (respiratory)    Stated Complaint: SOB     HPI    48 yo M with PMH afib on xarelto therapy, HTN, diastolic HF presenting for evaluation of one week HPI  Respiratory: (+) cough and shortness of breath. Cardiovascular: Negative for chest pain and palpitations. Gastrointestinal: Negative for vomiting and abdominal pain. Genitourinary: Negative for dysuria and hematuria.      Positive for stated com Notable for the following components:    Procalcitonin 1.46 (*)     All other components within normal limits   URINALYSIS WITH CULTURE REFLEX - Abnormal; Notable for the following components:    Protein Urine 30  (*)     Blood Urine Moderate (*)     Bacte unchanged from 11/2016 as interpreted by myself             CXR AP 2303 hours    Comparison: 11/25/2016      IMPRESSION:    - Mildly prominent peribronchial and interstitial opacities could reflect atelectasis, mild edema or atypical infection.   No focal c neb though with ongoing/intermittent tachypnea for which additional neb/steroids initiated.  Fever noted, CXR with questionable atypical infiltrate in setting of elevated PCT for which BCx thereafter sent and abx initiated given degree of PCT elevation; pre

## 2019-04-01 NOTE — PROGRESS NOTES
[facility] Pharmacy Note: Antimicrobial Weight Dose Adjustment for: ceftriaxone (ROCEPHIN)    Desmond Freeman is a 47year old male who has been prescribed ceftriaxone (ROCEPHIN) 1000 mg.   CrCl is estimated creatinine clearance is 70.8 mL/min (A) (based on SCr

## 2019-04-01 NOTE — H&P
[address] [facility] Patient Status:  Inpatient    1965 MRN [id number]   Location [facility] 3W/SW Attending Edvin Quan MD   Hosp Day # 0 PCP Mariluz Green MD     Date:  2019  Myesha Parker WITH MEALS   losartan 100 MG Oral Tab Take 1 tablet (100 mg total) by mouth once daily. Potassium Chloride ER 20 MEQ Oral Tab CR Take 1 tablet (20 mEq total) by mouth once daily.    XARELTO 20 MG Oral Tab TAKE 1 TABLET(20 MG) BY MOUTH DAILY WITH FOOD   Mu 03/31/2019    HGB 13.6 03/31/2019    HCT 42.2 03/31/2019    .0 03/31/2019    CREATSERUM 1.44 (H) 04/01/2019    BUN 21 (H) 04/01/2019     (L) 04/01/2019    K 3.9 04/01/2019    K 3.9 04/01/2019     04/01/2019    CO2 28.0 04/01/2019    GLU pending      Angel Bueno, ALANA  4/1/2019         Attending Addendum:      Seen and examined independently from APN.   In room, complaining of gas pain  No CP, no SOB  Non compliant with his meds  Lungs: dim BS BL  CVS: s1 s2 heard, RRR  Abd: obese  EXT

## 2019-04-01 NOTE — CONSULTS
[facility] AND [facility]  Cardiology Consultation    Rachel Recinos Patient Status:  Inpatient    1965 MRN [id number]   Location [facility] 3W/SW Attending Sadie Ahmadi MD   Hosp Day # 0 PCP Ashley Vines MD     Reason for Consultation 500 mg, Intravenous, Q24H  •  ipratropium-albuterol (DUONEB) nebulizer solution 3 mL, 3 mL, Nebulization, 6 times per day  •  methylPREDNISolone Sodium Succ (Solu-MEDROL) injection 40 mg, 40 mg, Intravenous, Q8H  •  [START ON 4/2/2019] cefTRIAXone Sodium ( chronic diastolic dysfunction  Noncompliance  Mildly elevated troponin, not in MI  Acute bronchitis      Recommendations:  Treatment of bronchitis as per primary service  Restart home medications and IV diuretics for now  Reconsider Xarelto with history of

## 2019-04-01 NOTE — PLAN OF CARE
- hypertensive - restart home meds losartan 100 daily, coreg 25 bid, diltiazem 360 daily,  Add prn hydralzine for now and ctm  - Xarelto 20mg daily for Afib  - treat respiratory illness per primary  - lasix 40 IV bid  - bmp in [facility]

## 2019-04-01 NOTE — ED INITIAL ASSESSMENT (HPI)
Pt ran out of home medications approximately three days ago including furosemide, carvedilol, diltiazem, losartan, and xarelto.

## 2019-04-02 ENCOUNTER — APPOINTMENT (OUTPATIENT)
Dept: GENERAL RADIOLOGY | Facility: HOSPITAL | Age: 54
DRG: 202 | End: 2019-04-02
Attending: NURSE PRACTITIONER
Payer: COMMERCIAL

## 2019-04-02 VITALS
TEMPERATURE: 98 F | DIASTOLIC BLOOD PRESSURE: 93 MMHG | BODY MASS INDEX: 34.94 KG/M2 | HEIGHT: 73 IN | SYSTOLIC BLOOD PRESSURE: 146 MMHG | HEART RATE: 88 BPM | RESPIRATION RATE: 18 BRPM | OXYGEN SATURATION: 98 % | WEIGHT: 263.63 LBS

## 2019-04-02 PROCEDURE — 71046 X-RAY EXAM CHEST 2 VIEWS: CPT | Performed by: NURSE PRACTITIONER

## 2019-04-02 PROCEDURE — 99239 HOSP IP/OBS DSCHRG MGMT >30: CPT | Performed by: HOSPITALIST

## 2019-04-02 RX ORDER — DILTIAZEM HYDROCHLORIDE 360 MG/1
360 CAPSULE, EXTENDED RELEASE ORAL DAILY
Qty: 30 CAPSULE | Refills: 0 | Status: SHIPPED | OUTPATIENT
Start: 2019-04-03 | End: 2019-05-31

## 2019-04-02 RX ORDER — LOSARTAN POTASSIUM 100 MG/1
100 TABLET ORAL
Qty: 30 TABLET | Refills: 0 | Status: SHIPPED | OUTPATIENT
Start: 2019-04-02 | End: 2019-05-31

## 2019-04-02 RX ORDER — PREDNISONE 20 MG/1
40 TABLET ORAL DAILY
Qty: 10 TABLET | Refills: 0 | Status: SHIPPED | OUTPATIENT
Start: 2019-04-02 | End: 2019-04-07

## 2019-04-02 RX ORDER — AZITHROMYCIN 500 MG/1
500 TABLET, FILM COATED ORAL ONCE
Qty: 1 TABLET | Refills: 0 | Status: SHIPPED | OUTPATIENT
Start: 2019-04-03 | End: 2019-04-03

## 2019-04-02 RX ORDER — ASPIRIN 81 MG/1
81 TABLET, CHEWABLE ORAL DAILY
Qty: 30 TABLET | Refills: 0 | Status: SHIPPED | OUTPATIENT
Start: 2019-04-03 | End: 2019-06-18

## 2019-04-02 RX ORDER — FUROSEMIDE 40 MG/1
40 TABLET ORAL DAILY
Qty: 30 TABLET | Refills: 0 | Status: SHIPPED | OUTPATIENT
Start: 2019-04-02 | End: 2019-05-31

## 2019-04-02 RX ORDER — IPRATROPIUM BROMIDE AND ALBUTEROL SULFATE 2.5; .5 MG/3ML; MG/3ML
3 SOLUTION RESPIRATORY (INHALATION)
Status: DISCONTINUED | OUTPATIENT
Start: 2019-04-02 | End: 2019-04-02

## 2019-04-02 RX ORDER — CARVEDILOL 25 MG/1
25 TABLET ORAL 2 TIMES DAILY WITH MEALS
Qty: 60 TABLET | Refills: 0 | Status: SHIPPED | OUTPATIENT
Start: 2019-04-02 | End: 2019-05-31

## 2019-04-02 RX ORDER — IPRATROPIUM BROMIDE AND ALBUTEROL SULFATE 2.5; .5 MG/3ML; MG/3ML
3 SOLUTION RESPIRATORY (INHALATION) EVERY 6 HOURS PRN
Status: DISCONTINUED | OUTPATIENT
Start: 2019-04-02 | End: 2019-04-02

## 2019-04-02 NOTE — DISCHARGE SUMMARY
[facility]    Discharge Summary    Barbie Em Patient Status:  Inpatient    1965 MRN [id number]   Location [facility] 3W/SW Attending Shelby Patricio MD   Hosp Day # 1 PCP Alisha Rodriugez MD     Date of Admission: his meds occasionally but not intentionally  Hasn't filled meds since December per surescripts     DVT: on xarelto  CODE: Full    ECHO:  Study Conclusions  1. Left ventricle:  The cavity size was normal. Wall thickness was     increased in a pattern of alma 04/01/2019    BUN 21 (H) 04/01/2019     (L) 04/01/2019    K 3.9 04/01/2019    K 3.9 04/01/2019     04/01/2019    CO2 28.0 04/01/2019     (H) 04/01/2019    CA 9.3 04/01/2019    ALB 3.8 05/06/2018    ALKPHO 69 05/06/2018    BILT 1.2 05/06/ tablet  Refills:  0     dilTIAZem HCl ER Beads 360 MG Cp24  Commonly known as:  Guille White  Start taking on:  4/3/2019  What changed:    · how much to take  · how to take this  · when to take this  · additional instructions      Take 1 capsule (360 mg total) b Physician  CARDIOLOGY                Other Discharge Instructions:   Discharge References/Attachments    Shortness of Breath (Dyspnea) (English)  Kicking the Smoking Habit (English)  Atrial Fibrillation, Discharge Instructions for (English)  Heart Failure,

## 2019-04-02 NOTE — PROGRESS NOTES
[facility] AND [facility]  [facility] Cardiology Progress Note    Nba Quinteros Patient Status:  Inpatient    1965 MRN [id number]   Location [facility] 3W/SW Attending Pearl Chester MD   Hosp Day # 1 PCP Heron Mariscal MD     48 yo male presen anicteric sclera, neck supple. Neck: No JVD, carotids 2+, no bruits. Cardiac: Regular rate and rhythm. S1, S2 normal. No murmur, pericardial rub, S3.  Lungs: Clear without wheezes, rales, rhonchi or dullness. Normal excursions and effort.   Abdomen: Soft

## 2019-04-03 ENCOUNTER — TELEPHONE (OUTPATIENT)
Dept: CARDIOLOGY UNIT | Facility: HOSPITAL | Age: 54
End: 2019-04-03

## 2019-04-03 ENCOUNTER — PATIENT OUTREACH (OUTPATIENT)
Dept: CASE MANAGEMENT | Age: 54
End: 2019-04-03

## 2019-04-03 NOTE — PROGRESS NOTES
NCM attempted to contact the patient for the second time for hospital follow up. Unable to leave message due to VM not being set up. NCM will try again at a later time.

## 2019-04-03 NOTE — PROGRESS NOTES
NCM attempted to contact the patient for hospital follow up. Unable to leave message due to \"mailbox not set up\". NCM will try again at another time.    [facility] BB date-4/16/2019

## 2019-04-08 ENCOUNTER — OFFICE VISIT (OUTPATIENT)
Dept: CARDIOLOGY | Age: 54
End: 2019-04-08

## 2019-04-08 VITALS
HEART RATE: 70 BPM | HEIGHT: 73 IN | BODY MASS INDEX: 35.65 KG/M2 | OXYGEN SATURATION: 98 % | DIASTOLIC BLOOD PRESSURE: 100 MMHG | WEIGHT: 269 LBS | SYSTOLIC BLOOD PRESSURE: 160 MMHG

## 2019-04-08 DIAGNOSIS — R79.89 ELEVATED TROPONIN: Primary | ICD-10-CM

## 2019-04-08 DIAGNOSIS — I10 ESSENTIAL HYPERTENSION: ICD-10-CM

## 2019-04-08 DIAGNOSIS — I50.32 CHRONIC DIASTOLIC CONGESTIVE HEART FAILURE, NYHA CLASS 1 (CMD): ICD-10-CM

## 2019-04-08 PROBLEM — I48.91 ATRIAL FIBRILLATION WITH RVR (CMD): Status: ACTIVE | Noted: 2019-04-08

## 2019-04-08 PROBLEM — I50.9 CHF (CONGESTIVE HEART FAILURE), NYHA CLASS I (CMD): Status: ACTIVE | Noted: 2019-04-08

## 2019-04-08 PROCEDURE — 3080F DIAST BP >= 90 MM HG: CPT | Performed by: INTERNAL MEDICINE

## 2019-04-08 PROCEDURE — 3077F SYST BP >= 140 MM HG: CPT | Performed by: INTERNAL MEDICINE

## 2019-04-08 PROCEDURE — 99244 OFF/OP CNSLTJ NEW/EST MOD 40: CPT | Performed by: INTERNAL MEDICINE

## 2019-04-08 RX ORDER — FUROSEMIDE 40 MG/1
40 TABLET ORAL DAILY
COMMUNITY
Start: 2019-04-02

## 2019-04-08 RX ORDER — ASPIRIN 81 MG/1
81 TABLET, CHEWABLE ORAL DAILY
COMMUNITY
Start: 2019-04-03

## 2019-04-08 RX ORDER — DILTIAZEM HYDROCHLORIDE 360 MG/1
360 CAPSULE, EXTENDED RELEASE ORAL
COMMUNITY
Start: 2019-04-03

## 2019-04-08 RX ORDER — HYDROCODONE BITARTRATE AND ACETAMINOPHEN 5; 325 MG/1; MG/1
TABLET ORAL
Refills: 0 | COMMUNITY
Start: 2019-02-15

## 2019-04-08 RX ORDER — LOSARTAN POTASSIUM 100 MG/1
100 TABLET ORAL
COMMUNITY
Start: 2019-04-02

## 2019-04-08 RX ORDER — MULTIVITAMIN
1 TABLET ORAL
COMMUNITY
Start: 2016-12-28

## 2019-04-08 RX ORDER — CARVEDILOL 25 MG/1
25 TABLET ORAL 2 TIMES DAILY
COMMUNITY
Start: 2019-04-02

## 2019-04-08 SDOH — HEALTH STABILITY: PHYSICAL HEALTH: ON AVERAGE, HOW MANY MINUTES DO YOU ENGAGE IN EXERCISE AT THIS LEVEL?: 30 MIN

## 2019-04-08 SDOH — HEALTH STABILITY: PHYSICAL HEALTH: ON AVERAGE, HOW MANY DAYS PER WEEK DO YOU ENGAGE IN MODERATE TO STRENUOUS EXERCISE (LIKE A BRISK WALK)?: 5 DAYS

## 2019-04-08 SDOH — HEALTH STABILITY: MENTAL HEALTH
STRESS IS WHEN SOMEONE FEELS TENSE, NERVOUS, ANXIOUS, OR CAN'T SLEEP AT NIGHT BECAUSE THEIR MIND IS TROUBLED. HOW STRESSED ARE YOU?: NOT AT ALL

## 2019-04-09 NOTE — PROGRESS NOTES
NCM attempted to contact the patient for hospital follow up. A man answered the phone, did not identify himself and stated that the patient was unavailable right now and to try again at another time. NCM will try again at another time.

## 2019-04-16 NOTE — PROGRESS NOTES
Several attempts made to reach the patient with no return call. It is now past the 14 day timeframe. Closing encounter.

## 2019-05-23 DIAGNOSIS — I10 ESSENTIAL HYPERTENSION: ICD-10-CM

## 2019-05-24 RX ORDER — RIVAROXABAN 20 MG/1
TABLET, FILM COATED ORAL
Qty: 30 TABLET | Refills: 0 | Status: SHIPPED | OUTPATIENT
Start: 2019-05-24 | End: 2019-06-18

## 2019-05-24 RX ORDER — RIVAROXABAN 20 MG/1
TABLET, FILM COATED ORAL
Qty: 30 TABLET | Refills: 0 | OUTPATIENT
Start: 2019-05-24

## 2019-05-24 NOTE — TELEPHONE ENCOUNTER
Please review; protocol failed. Please advise some of the medications have changed since his hospitalization.

## 2019-05-24 NOTE — TELEPHONE ENCOUNTER
Please advise  Per Hospital notes on 3/31/19    STOP taking these medications           Potassium Chloride ER 20 MEQ Tbcr  Commonly known as:  K-DUR M20                    CHANGE how you take these medications      carvedilol 25 MG Tabs  Commonly known as: 04/01/2019    K 3.9 04/01/2019    K 3.9 04/01/2019     04/01/2019    CO2 28.0 04/01/2019    GLOBULIN 2.8 05/06/2018    AGRATIO 1.3 07/13/2012    ANIONGAP 6 04/01/2019    OSMOCALC 284 04/01/2019

## 2019-05-25 NOTE — TELEPHONE ENCOUNTER
Please call pt,unless he has an appt scheduled,i will not send in meds as he is very noncompliant and it is not appropriate to fill meds when pt is not evaluated.   He can be added to any lunch 12.30 appt if no other pts in that slot

## 2019-05-28 RX ORDER — LOSARTAN POTASSIUM 100 MG/1
TABLET ORAL
Qty: 30 TABLET | Refills: 0 | OUTPATIENT
Start: 2019-05-28

## 2019-05-28 RX ORDER — CARVEDILOL 25 MG/1
TABLET ORAL
Qty: 60 TABLET | Refills: 0 | OUTPATIENT
Start: 2019-05-28

## 2019-05-28 RX ORDER — POTASSIUM CHLORIDE 20 MEQ/1
TABLET, EXTENDED RELEASE ORAL
Qty: 30 TABLET | Refills: 0 | OUTPATIENT
Start: 2019-05-28

## 2019-05-28 RX ORDER — DILTIAZEM HYDROCHLORIDE 360 MG/1
CAPSULE, EXTENDED RELEASE ORAL
Qty: 30 CAPSULE | Refills: 0 | OUTPATIENT
Start: 2019-05-28

## 2019-05-28 RX ORDER — FUROSEMIDE 40 MG/1
TABLET ORAL
Qty: 30 TABLET | Refills: 0 | OUTPATIENT
Start: 2019-05-28

## 2019-05-31 RX ORDER — DILTIAZEM HYDROCHLORIDE 360 MG/1
360 CAPSULE, EXTENDED RELEASE ORAL DAILY
Qty: 30 CAPSULE | Refills: 0 | Status: SHIPPED | OUTPATIENT
Start: 2019-05-31 | End: 2019-06-18

## 2019-05-31 RX ORDER — LOSARTAN POTASSIUM 100 MG/1
100 TABLET ORAL
Qty: 30 TABLET | Refills: 0 | Status: SHIPPED | OUTPATIENT
Start: 2019-05-31 | End: 2019-06-18

## 2019-05-31 RX ORDER — FUROSEMIDE 40 MG/1
40 TABLET ORAL DAILY
Qty: 30 TABLET | Refills: 0 | Status: SHIPPED | OUTPATIENT
Start: 2019-05-31 | End: 2019-06-18

## 2019-05-31 RX ORDER — CARVEDILOL 25 MG/1
25 TABLET ORAL 2 TIMES DAILY WITH MEALS
Qty: 60 TABLET | Refills: 0 | Status: SHIPPED | OUTPATIENT
Start: 2019-05-31 | End: 2019-06-18

## 2019-05-31 NOTE — TELEPHONE ENCOUNTER
Contacted [facility]--ok to send 30 day refills for pended medications--reviewed all 4 meds/doses/frequency.

## 2019-05-31 NOTE — TELEPHONE ENCOUNTER
Spoke with patient ( verified) and relayed [facility] message below--patient verbalizes understanding and agreement--was out of town and just got back.     Relayed to patient [facility] filled Xarelto 19 for #30--patient requesting 30 day supply for Losartan, Fur

## 2019-06-18 ENCOUNTER — LAB ENCOUNTER (OUTPATIENT)
Dept: LAB | Facility: HOSPITAL | Age: 54
End: 2019-06-18
Attending: INTERNAL MEDICINE
Payer: COMMERCIAL

## 2019-06-18 ENCOUNTER — OFFICE VISIT (OUTPATIENT)
Dept: INTERNAL MEDICINE CLINIC | Facility: CLINIC | Age: 54
End: 2019-06-18
Payer: COMMERCIAL

## 2019-06-18 VITALS
RESPIRATION RATE: 18 BRPM | DIASTOLIC BLOOD PRESSURE: 106 MMHG | HEART RATE: 98 BPM | HEIGHT: 73 IN | SYSTOLIC BLOOD PRESSURE: 170 MMHG | BODY MASS INDEX: 35.92 KG/M2 | WEIGHT: 271 LBS

## 2019-06-18 DIAGNOSIS — G47.33 OBSTRUCTIVE SLEEP APNEA SYNDROME: ICD-10-CM

## 2019-06-18 DIAGNOSIS — I48.91 ATRIAL FIBRILLATION WITH RVR (HCC): ICD-10-CM

## 2019-06-18 DIAGNOSIS — I27.20 PULMONARY HTN (HCC): Primary | ICD-10-CM

## 2019-06-18 DIAGNOSIS — I10 ESSENTIAL HYPERTENSION: ICD-10-CM

## 2019-06-18 DIAGNOSIS — I50.31: ICD-10-CM

## 2019-06-18 PROCEDURE — 36415 COLL VENOUS BLD VENIPUNCTURE: CPT

## 2019-06-18 PROCEDURE — 81001 URINALYSIS AUTO W/SCOPE: CPT

## 2019-06-18 PROCEDURE — 99214 OFFICE O/P EST MOD 30 MIN: CPT | Performed by: INTERNAL MEDICINE

## 2019-06-18 PROCEDURE — 82570 ASSAY OF URINE CREATININE: CPT

## 2019-06-18 PROCEDURE — 80061 LIPID PANEL: CPT

## 2019-06-18 PROCEDURE — 82043 UR ALBUMIN QUANTITATIVE: CPT

## 2019-06-18 PROCEDURE — 84443 ASSAY THYROID STIM HORMONE: CPT

## 2019-06-18 PROCEDURE — 99212 OFFICE O/P EST SF 10 MIN: CPT | Performed by: INTERNAL MEDICINE

## 2019-06-18 PROCEDURE — 85025 COMPLETE CBC W/AUTO DIFF WBC: CPT

## 2019-06-18 PROCEDURE — 80053 COMPREHEN METABOLIC PANEL: CPT

## 2019-06-18 RX ORDER — FUROSEMIDE 40 MG/1
40 TABLET ORAL DAILY
Qty: 30 TABLET | Refills: 3 | Status: SHIPPED | OUTPATIENT
Start: 2019-06-18 | End: 2019-09-23

## 2019-06-18 RX ORDER — DILTIAZEM HYDROCHLORIDE 360 MG/1
360 CAPSULE, EXTENDED RELEASE ORAL DAILY
Qty: 30 CAPSULE | Refills: 2 | Status: SHIPPED | OUTPATIENT
Start: 2019-06-18 | End: 2019-09-23

## 2019-06-18 RX ORDER — LOSARTAN POTASSIUM 100 MG/1
100 TABLET ORAL
Qty: 30 TABLET | Refills: 3 | Status: SHIPPED | OUTPATIENT
Start: 2019-06-18 | End: 2019-09-23

## 2019-06-18 RX ORDER — CARVEDILOL 25 MG/1
25 TABLET ORAL 2 TIMES DAILY WITH MEALS
Qty: 60 TABLET | Refills: 3 | Status: SHIPPED | OUTPATIENT
Start: 2019-06-18 | End: 2019-09-23

## 2019-06-18 NOTE — ASSESSMENT & PLAN NOTE
Pulmonary pressures quite elevated, rule out possibility of pulmonary hypertension due to. He does have atrial fibrillation for which he has been on treatment. Heart rate seems controlled at this time.   His last pulmonary artery systolic pressure was at

## 2019-06-18 NOTE — PROGRESS NOTES
HPI:    Patient ID: Barbie Em is a 49year old male.     Date of Admission: 3/31/2019        Date of Discharge: 04/02/19  No discharge date for patient encounter.  [facility] Discharge Diagnoses: CHF / bronchitis            DISCHARGE DX:Acute on Range    Blood Culture Result No Growth 1 Day N/A          IMAGING STUDIES: SOME MAY NEED FOLLOW UP WITH PCP   Xr Chest Pa + Lat Chest (dwq=[id number])     Result Date: 4/2/2019  CONCLUSION:  1. No acute cardiopulmonary disease.   Scattered pulmonary parenchymal consultation. Rivka Hooker MD  4/8/2019              Hypertension   This is a chronic problem. The current episode started more than 1 year ago. The problem has been waxing and waning since onset. The problem is uncontrolled.  Associated symptoms includ chest pain, palpitations, orthopnea and PND. Gastrointestinal: Negative. Genitourinary: Negative. Musculoskeletal: Negative. Skin: Negative. Neurological: Negative. Psychiatric/Behavioral: Negative.                Current Outpatient Medicat Lymphadenopathy:     He has no cervical adenopathy. Neurological: He is alert and oriented to person, place, and time. He has normal reflexes. Skin: Skin is warm and dry. Nursing note and vitals reviewed.              ASSESSMENT/PLAN:     Problem Jen Noss possibility of pulmonary hypertension due to. He does have atrial fibrillation for which he has been on treatment. Heart rate seems controlled at this time. His last pulmonary artery systolic pressure was at 52. Recheck echocardiogram has been ordered. Take 1 tablet (25 mg total) by mouth 2 (two) times daily with meals. • furosemide 40 MG Oral Tab 30 tablet 3     Sig: Take 1 tablet (40 mg total) by mouth daily.    • hydrALAzine HCl 10 MG Oral Tab 90 tablet 2     Sig: Take 1 tablet (10 mg total) by mouth

## 2019-06-18 NOTE — ASSESSMENT & PLAN NOTE
History of severe sleep apnea for which he has been on the CPAP. Patient tells me that he has been compliant with the use of his machine.

## 2019-06-18 NOTE — PATIENT INSTRUCTIONS
Problem List Items Addressed This Visit        Unprioritized    Atrial fibrillation with RVR (HCC)    CHF (congestive heart failure), NY[facility] class I ([facility]     Recent admission to the hospital with shortness of breath and lower extremity edema after a bronchi Oral Tab    Other Relevant Orders    MISCELLANEOUS TESTING    CBC WITH DIFFERENTIAL WITH PLATELET    COMP METABOLIC PANEL (14)    LIPID PANEL    TSH W REFLEX TO FREE T4    URINALYSIS, ROUTINE    MICROALB/CREAT RATIO, RANDOM URINE    Pulmonary HTN (HCC) - P

## 2019-06-18 NOTE — ASSESSMENT & PLAN NOTE
According to notes patient has been on diltiazem 360 mg once daily, losartan 100 mg daily, Coreg 25 mg daily and Lasix 40 mg daily    Blood pressure is quite elevated at this time even upon recheck.     Stat labs to be completed today for the kidney functio

## 2019-06-18 NOTE — ASSESSMENT & PLAN NOTE
Recent admission to the hospital with shortness of breath and lower extremity edema after a bronchitic attack. He was noted to have extremely high blood pressures and irregular heart rhythm and diagnosed with atrial fibrillation as a recurrence.   He  He h

## 2019-06-19 ENCOUNTER — TELEPHONE (OUTPATIENT)
Dept: INTERNAL MEDICINE CLINIC | Facility: CLINIC | Age: 54
End: 2019-06-19

## 2019-06-19 RX ORDER — HYDRALAZINE HYDROCHLORIDE 10 MG/1
10 TABLET, FILM COATED ORAL 3 TIMES DAILY
Qty: 90 TABLET | Refills: 2 | Status: SHIPPED | OUTPATIENT
Start: 2019-06-19 | End: 2019-09-23

## 2019-06-19 NOTE — TELEPHONE ENCOUNTER
Called pt to give him results and pt states he had called in to clarify which meds he was taking since Dr Sharyle Mealing was going to add b/p med or increase dose. Please see below and advise.       Nurses- pt would like meds sent to [facility] on [address] [place] [address]

## 2019-06-19 NOTE — TELEPHONE ENCOUNTER
Prescription sent for 10 mg 3 times daily, please advised the patient to return for follow-up visit in 2 weeks for a blood pressure

## 2019-06-19 NOTE — TELEPHONE ENCOUNTER
Per pt he was supposed to call back to inform  the name of the medication he is taking :       Current Outpatient Medications:  losartan 100 MG Oral Tab Take 1 tablet (100 mg total) by mouth once daily.  Disp: 30 tablet Rfl: 3   carvedilol 25 MG Oral Tab

## 2019-06-29 ENCOUNTER — HOSPITAL ENCOUNTER (OUTPATIENT)
Dept: CV DIAGNOSTICS | Facility: HOSPITAL | Age: 54
Discharge: HOME | End: 2019-06-29
Attending: INTERNAL MEDICINE
Payer: COMMERCIAL

## 2019-06-29 DIAGNOSIS — I50.31: ICD-10-CM

## 2019-06-29 DIAGNOSIS — I27.20 PULMONARY HTN (HCC): ICD-10-CM

## 2019-06-29 PROCEDURE — 93306 TTE W/DOPPLER COMPLETE: CPT | Performed by: INTERNAL MEDICINE

## 2019-07-03 ENCOUNTER — MED REC SCAN ONLY (OUTPATIENT)
Dept: INTERNAL MEDICINE CLINIC | Facility: CLINIC | Age: 54
End: 2019-07-03

## 2019-07-12 NOTE — TELEPHONE ENCOUNTER
Review pended refill request as it does not fall under a protocol.     Last Rx: 4/2/19  LOV: 3 weeks ago

## 2019-07-13 RX ORDER — RIVAROXABAN 20 MG/1
TABLET, FILM COATED ORAL
Qty: 30 TABLET | Refills: 2 | Status: SHIPPED | OUTPATIENT
Start: 2019-07-13 | End: 2019-11-27

## 2019-09-23 ENCOUNTER — HOSPITAL ENCOUNTER (EMERGENCY)
Facility: HOSPITAL | Age: 54
Discharge: HOME | End: 2019-09-23
Attending: EMERGENCY MEDICINE
Payer: COMMERCIAL

## 2019-09-23 ENCOUNTER — APPOINTMENT (OUTPATIENT)
Dept: CT IMAGING | Facility: HOSPITAL | Age: 54
End: 2019-09-23
Attending: EMERGENCY MEDICINE
Payer: COMMERCIAL

## 2019-09-23 ENCOUNTER — APPOINTMENT (OUTPATIENT)
Dept: GENERAL RADIOLOGY | Facility: HOSPITAL | Age: 54
End: 2019-09-23
Attending: EMERGENCY MEDICINE
Payer: COMMERCIAL

## 2019-09-23 VITALS
HEART RATE: 83 BPM | SYSTOLIC BLOOD PRESSURE: 166 MMHG | WEIGHT: 262.81 LBS | TEMPERATURE: 99 F | DIASTOLIC BLOOD PRESSURE: 123 MMHG | OXYGEN SATURATION: 97 % | HEIGHT: 73 IN | BODY MASS INDEX: 34.83 KG/M2 | RESPIRATION RATE: 16 BRPM

## 2019-09-23 DIAGNOSIS — S09.93XA FACIAL INJURY, INITIAL ENCOUNTER: ICD-10-CM

## 2019-09-23 DIAGNOSIS — I10 ESSENTIAL HYPERTENSION: ICD-10-CM

## 2019-09-23 DIAGNOSIS — Z91.14 NONCOMPLIANCE WITH MEDICATION REGIMEN: ICD-10-CM

## 2019-09-23 DIAGNOSIS — I48.91 ATRIAL FIBRILLATION WITH RAPID VENTRICULAR RESPONSE (HCC): Primary | ICD-10-CM

## 2019-09-23 LAB
ANION GAP SERPL CALC-SCNC: 8 MMOL/L (ref 0–18)
APTT PPP: 33.6 SECONDS (ref 23.2–35.3)
BASOPHILS # BLD AUTO: 0.07 X10(3) UL (ref 0–0.2)
BASOPHILS NFR BLD AUTO: 0.6 %
BUN BLD-MCNC: 15 MG/DL (ref 7–18)
BUN/CREAT SERPL: 10.6 (ref 10–20)
CALCIUM BLD-MCNC: 9.1 MG/DL (ref 8.5–10.1)
CHLORIDE SERPL-SCNC: 107 MMOL/L (ref 98–112)
CO2 SERPL-SCNC: 27 MMOL/L (ref 21–32)
CREAT BLD-MCNC: 1.41 MG/DL (ref 0.7–1.3)
DEPRECATED RDW RBC AUTO: 41.8 FL (ref 35.1–46.3)
EOSINOPHIL # BLD AUTO: 0.11 X10(3) UL (ref 0–0.7)
EOSINOPHIL NFR BLD AUTO: 1 %
ERYTHROCYTE [DISTWIDTH] IN BLOOD BY AUTOMATED COUNT: 13 % (ref 11–15)
GLUCOSE BLD-MCNC: 228 MG/DL (ref 70–99)
HAV IGM SER QL: 1.9 MG/DL (ref 1.6–2.6)
HCT VFR BLD AUTO: 45.5 % (ref 39–53)
HGB BLD-MCNC: 15.1 G/DL (ref 13–17.5)
IMM GRANULOCYTES # BLD AUTO: 0.03 X10(3) UL (ref 0–1)
IMM GRANULOCYTES NFR BLD: 0.3 %
LYMPHOCYTES # BLD AUTO: 1.78 X10(3) UL (ref 1–4)
LYMPHOCYTES NFR BLD AUTO: 15.5 %
MCH RBC QN AUTO: 29.3 PG (ref 26–34)
MCHC RBC AUTO-ENTMCNC: 33.2 G/DL (ref 31–37)
MCV RBC AUTO: 88.3 FL (ref 80–100)
MONOCYTES # BLD AUTO: 0.65 X10(3) UL (ref 0.1–1)
MONOCYTES NFR BLD AUTO: 5.7 %
NEUTROPHILS # BLD AUTO: 8.82 X10 (3) UL (ref 1.5–7.7)
NEUTROPHILS # BLD AUTO: 8.82 X10(3) UL (ref 1.5–7.7)
NEUTROPHILS NFR BLD AUTO: 76.9 %
NT-PROBNP SERPL-MCNC: 3593 PG/ML (ref ?–125)
OSMOLALITY SERPL CALC.SUM OF ELEC: 302 MOSM/KG (ref 275–295)
PLATELET # BLD AUTO: 189 10(3)UL (ref 150–450)
POTASSIUM SERPL-SCNC: 3.1 MMOL/L (ref 3.5–5.1)
RBC # BLD AUTO: 5.15 X10(6)UL (ref 4.3–5.7)
SODIUM SERPL-SCNC: 142 MMOL/L (ref 136–145)
TROPONIN I SERPL-MCNC: <0.045 NG/ML (ref ?–0.04)
WBC # BLD AUTO: 11.5 X10(3) UL (ref 4–11)

## 2019-09-23 PROCEDURE — 70450 CT HEAD/BRAIN W/O DYE: CPT | Performed by: EMERGENCY MEDICINE

## 2019-09-23 PROCEDURE — 70486 CT MAXILLOFACIAL W/O DYE: CPT | Performed by: EMERGENCY MEDICINE

## 2019-09-23 PROCEDURE — 96366 THER/PROPH/DIAG IV INF ADDON: CPT

## 2019-09-23 PROCEDURE — 83880 ASSAY OF NATRIURETIC PEPTIDE: CPT | Performed by: EMERGENCY MEDICINE

## 2019-09-23 PROCEDURE — 85730 THROMBOPLASTIN TIME PARTIAL: CPT | Performed by: EMERGENCY MEDICINE

## 2019-09-23 PROCEDURE — 71045 X-RAY EXAM CHEST 1 VIEW: CPT | Performed by: EMERGENCY MEDICINE

## 2019-09-23 PROCEDURE — 84484 ASSAY OF TROPONIN QUANT: CPT | Performed by: EMERGENCY MEDICINE

## 2019-09-23 PROCEDURE — 85025 COMPLETE CBC W/AUTO DIFF WBC: CPT | Performed by: EMERGENCY MEDICINE

## 2019-09-23 PROCEDURE — 93005 ELECTROCARDIOGRAM TRACING: CPT

## 2019-09-23 PROCEDURE — 73610 X-RAY EXAM OF ANKLE: CPT | Performed by: EMERGENCY MEDICINE

## 2019-09-23 PROCEDURE — 93010 ELECTROCARDIOGRAM REPORT: CPT | Performed by: EMERGENCY MEDICINE

## 2019-09-23 PROCEDURE — 96375 TX/PRO/DX INJ NEW DRUG ADDON: CPT

## 2019-09-23 PROCEDURE — 83735 ASSAY OF MAGNESIUM: CPT | Performed by: EMERGENCY MEDICINE

## 2019-09-23 PROCEDURE — 96365 THER/PROPH/DIAG IV INF INIT: CPT

## 2019-09-23 PROCEDURE — 99285 EMERGENCY DEPT VISIT HI MDM: CPT

## 2019-09-23 PROCEDURE — 80048 BASIC METABOLIC PNL TOTAL CA: CPT | Performed by: EMERGENCY MEDICINE

## 2019-09-23 PROCEDURE — 99284 EMERGENCY DEPT VISIT MOD MDM: CPT | Performed by: INTERNAL MEDICINE

## 2019-09-23 RX ORDER — HYDRALAZINE HYDROCHLORIDE 10 MG/1
10 TABLET, FILM COATED ORAL 3 TIMES DAILY
Qty: 15 TABLET | Refills: 0 | Status: SHIPPED | OUTPATIENT
Start: 2019-09-23 | End: 2019-09-28

## 2019-09-23 RX ORDER — HEPARIN SODIUM 1000 [USP'U]/ML
5000 INJECTION, SOLUTION INTRAVENOUS; SUBCUTANEOUS ONCE
Status: DISCONTINUED | OUTPATIENT
Start: 2019-09-23 | End: 2019-09-23

## 2019-09-23 RX ORDER — HEPARIN SODIUM AND DEXTROSE 10000; 5 [USP'U]/100ML; G/100ML
INJECTION INTRAVENOUS CONTINUOUS
Status: CANCELLED | OUTPATIENT
Start: 2019-09-23

## 2019-09-23 RX ORDER — CARVEDILOL 25 MG/1
25 TABLET ORAL 2 TIMES DAILY WITH MEALS
Qty: 60 TABLET | Refills: 3 | Status: SHIPPED | OUTPATIENT
Start: 2019-09-23 | End: 2019-09-23

## 2019-09-23 RX ORDER — METOPROLOL TARTRATE 5 MG/5ML
2.5 INJECTION INTRAVENOUS ONCE
Status: COMPLETED | OUTPATIENT
Start: 2019-09-23 | End: 2019-09-23

## 2019-09-23 RX ORDER — DILTIAZEM HYDROCHLORIDE 5 MG/ML
15 INJECTION INTRAVENOUS ONCE
Status: COMPLETED | OUTPATIENT
Start: 2019-09-23 | End: 2019-09-23

## 2019-09-23 RX ORDER — DILTIAZEM HYDROCHLORIDE 360 MG/1
360 CAPSULE, EXTENDED RELEASE ORAL DAILY
Qty: 5 CAPSULE | Refills: 0 | Status: SHIPPED | OUTPATIENT
Start: 2019-09-23 | End: 2019-09-28

## 2019-09-23 RX ORDER — HEPARIN SODIUM AND DEXTROSE 10000; 5 [USP'U]/100ML; G/100ML
1000 INJECTION INTRAVENOUS ONCE
Status: DISCONTINUED | OUTPATIENT
Start: 2019-09-23 | End: 2019-09-23

## 2019-09-23 RX ORDER — DILTIAZEM HYDROCHLORIDE 360 MG/1
360 CAPSULE, EXTENDED RELEASE ORAL DAILY
Qty: 30 CAPSULE | Refills: 2 | Status: SHIPPED | OUTPATIENT
Start: 2019-09-23 | End: 2019-09-23

## 2019-09-23 RX ORDER — HYDRALAZINE HYDROCHLORIDE 10 MG/1
10 TABLET, FILM COATED ORAL 3 TIMES DAILY
Qty: 90 TABLET | Refills: 2 | Status: SHIPPED | OUTPATIENT
Start: 2019-09-23 | End: 2019-09-23

## 2019-09-23 RX ORDER — CARVEDILOL 25 MG/1
25 TABLET ORAL 2 TIMES DAILY WITH MEALS
Qty: 10 TABLET | Refills: 0 | Status: SHIPPED | OUTPATIENT
Start: 2019-09-23 | End: 2019-09-28

## 2019-09-23 RX ORDER — METOPROLOL TARTRATE 50 MG/1
50 TABLET, FILM COATED ORAL ONCE
Status: COMPLETED | OUTPATIENT
Start: 2019-09-23 | End: 2019-09-23

## 2019-09-23 RX ORDER — FUROSEMIDE 40 MG/1
40 TABLET ORAL DAILY
Qty: 5 TABLET | Refills: 0 | Status: SHIPPED | OUTPATIENT
Start: 2019-09-23 | End: 2019-09-28

## 2019-09-23 RX ORDER — FUROSEMIDE 40 MG/1
40 TABLET ORAL DAILY
Qty: 30 TABLET | Refills: 3 | Status: SHIPPED | OUTPATIENT
Start: 2019-09-23 | End: 2019-09-23

## 2019-09-23 RX ORDER — POTASSIUM CHLORIDE 20 MEQ/1
40 TABLET, EXTENDED RELEASE ORAL ONCE
Status: COMPLETED | OUTPATIENT
Start: 2019-09-23 | End: 2019-09-23

## 2019-09-23 RX ORDER — LOSARTAN POTASSIUM 100 MG/1
100 TABLET ORAL
Qty: 30 TABLET | Refills: 3 | Status: SHIPPED | OUTPATIENT
Start: 2019-09-23 | End: 2019-09-23

## 2019-09-23 RX ORDER — LOSARTAN POTASSIUM 100 MG/1
100 TABLET ORAL
Qty: 5 TABLET | Refills: 0 | Status: SHIPPED | OUTPATIENT
Start: 2019-09-23 | End: 2019-09-28

## 2019-09-23 NOTE — ED NOTES
Pt refusing to be admitted and refusing heparin/2nd line placement. Pt is alert, oriented and verbal, denies distress. Denies cp or sob.  Dr Cristino Horton is aware of patients refusal. Risks of leaving AMA have been thoroughly discussed with the patient
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
## 2019-09-23 NOTE — ED PROVIDER NOTES
Patient Seen in: [facility] AND [facility] Emergency Department      History   Patient presents with:  Eye Trauma    Stated Complaint: left eye/ear/jaw injury    HPI    15-year-old male who is healthy with history of A. fib and diastolic heart failure as well as 100 %   O2 Device None (Room air)       Current:BP (!) 166/123   Pulse 83   Temp 98.5 °F (36.9 °C) (Temporal)   Resp 16   Ht 185.4 cm (6' 1\")   Wt 119.2 kg   SpO2 97%   BMI 34.67 kg/m²         Physical Exam    Constitutional: Oriented to person, place, an following components:    Pro-Beta Natriuretic Peptide 3,593 (*)     All other components within normal limits   CBC W/ DIFFERENTIAL - Abnormal; Notable for the following components:    WBC 11.5 (*)     Neutrophil Absolute Prelim 8.82 (*)     Neutrophil Abs vascular calcifications. .          CONCLUSION:   Lateral soft tissue swelling without acute fracture or dislocation. Small osteochondral lesion lateral talar dome without collapse of the articular surface.   If there is continued pain, followup radiographs evidence of postseptal extension or retrobulbar hematoma. OTHER: Minimal atherosclerotic vascular calcifications are perceived in the cavernous carotid arteries. The mastoid complexes are hypoplastic and underpneumatized bilaterally.  Trace dental amalgam arteries, and left carotid bifurcation. CONCLUSION:  1. No acute fracture or subluxation. 2. Multiple missing teeth, and some fractured teeth. 3. Right mastoiditis and otitis media.  4. Degenerative changes in the spine including minimal retrolisthe saw the patient here after given a dose of Xarelto x1 and recommended to be off her Xarelto given his facial contusions but lack of intracranial hemorrhage.   The patient adamantly will not stay in the hospital despite my repetitive attempts at educating hi

## 2019-09-23 NOTE — ED NOTES
dilt stopped per dr Leonidas Baumann. Pt stable. Denies pain.  Alert, oriented, verbal, ambulatory with steady gait
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
## 2019-09-23 NOTE — CONSULTS
[facility] Card[facility] Consult Note    Jim Vyas Patient Status:  Emergency    1965 MRN [id number]   Location [address] Attending Francis Zamorano MD   Hosp Day # 0 PCP Jayla Bradley MD     49year old male, cons Relation Age of Onset   • Hypertension Father    • Diabetes Mother       reports that he has been smoking cigarettes. He has a 3.00 pack-year smoking history. He has never used smokeless tobacco. He reports that he drinks alcohol.  He reports that he does n

## 2019-10-03 ENCOUNTER — APPOINTMENT (OUTPATIENT)
Dept: CT IMAGING | Facility: HOSPITAL | Age: 54
End: 2019-10-03
Attending: EMERGENCY MEDICINE
Payer: COMMERCIAL

## 2019-10-03 ENCOUNTER — HOSPITAL ENCOUNTER (EMERGENCY)
Facility: HOSPITAL | Age: 54
Discharge: HOME | End: 2019-10-03
Attending: EMERGENCY MEDICINE
Payer: COMMERCIAL

## 2019-10-03 VITALS
SYSTOLIC BLOOD PRESSURE: 195 MMHG | HEIGHT: 72 IN | DIASTOLIC BLOOD PRESSURE: 117 MMHG | WEIGHT: 265 LBS | OXYGEN SATURATION: 98 % | TEMPERATURE: 100 F | BODY MASS INDEX: 35.89 KG/M2 | RESPIRATION RATE: 20 BRPM | HEART RATE: 61 BPM

## 2019-10-03 DIAGNOSIS — K29.00 ACUTE GASTRITIS WITHOUT HEMORRHAGE, UNSPECIFIED GASTRITIS TYPE: Primary | ICD-10-CM

## 2019-10-03 PROCEDURE — 80048 BASIC METABOLIC PNL TOTAL CA: CPT | Performed by: EMERGENCY MEDICINE

## 2019-10-03 PROCEDURE — 93005 ELECTROCARDIOGRAM TRACING: CPT

## 2019-10-03 PROCEDURE — C9113 INJ PANTOPRAZOLE SODIUM, VIA: HCPCS | Performed by: EMERGENCY MEDICINE

## 2019-10-03 PROCEDURE — 93010 ELECTROCARDIOGRAM REPORT: CPT | Performed by: EMERGENCY MEDICINE

## 2019-10-03 PROCEDURE — 83690 ASSAY OF LIPASE: CPT | Performed by: EMERGENCY MEDICINE

## 2019-10-03 PROCEDURE — 80076 HEPATIC FUNCTION PANEL: CPT | Performed by: EMERGENCY MEDICINE

## 2019-10-03 PROCEDURE — 96374 THER/PROPH/DIAG INJ IV PUSH: CPT

## 2019-10-03 PROCEDURE — 74177 CT ABD & PELVIS W/CONTRAST: CPT | Performed by: EMERGENCY MEDICINE

## 2019-10-03 PROCEDURE — 81001 URINALYSIS AUTO W/SCOPE: CPT | Performed by: EMERGENCY MEDICINE

## 2019-10-03 PROCEDURE — 83735 ASSAY OF MAGNESIUM: CPT | Performed by: EMERGENCY MEDICINE

## 2019-10-03 PROCEDURE — 96361 HYDRATE IV INFUSION ADD-ON: CPT

## 2019-10-03 PROCEDURE — 96375 TX/PRO/DX INJ NEW DRUG ADDON: CPT

## 2019-10-03 PROCEDURE — 99285 EMERGENCY DEPT VISIT HI MDM: CPT

## 2019-10-03 PROCEDURE — 85025 COMPLETE CBC W/AUTO DIFF WBC: CPT | Performed by: EMERGENCY MEDICINE

## 2019-10-03 RX ORDER — CARVEDILOL 25 MG/1
25 TABLET ORAL ONCE
Status: DISCONTINUED | OUTPATIENT
Start: 2019-10-03 | End: 2019-10-03

## 2019-10-03 RX ORDER — HYDRALAZINE HYDROCHLORIDE 25 MG/1
25 TABLET, FILM COATED ORAL ONCE
Status: DISCONTINUED | OUTPATIENT
Start: 2019-10-03 | End: 2019-10-03

## 2019-10-03 RX ORDER — SUCRALFATE 1 G/1
1 TABLET ORAL
Qty: 56 TABLET | Refills: 0 | Status: SHIPPED | OUTPATIENT
Start: 2019-10-03 | End: 2019-10-17

## 2019-10-03 RX ORDER — ONDANSETRON 2 MG/ML
4 INJECTION INTRAMUSCULAR; INTRAVENOUS ONCE
Status: COMPLETED | OUTPATIENT
Start: 2019-10-03 | End: 2019-10-03

## 2019-10-03 RX ORDER — PANTOPRAZOLE SODIUM 40 MG/1
40 TABLET, DELAYED RELEASE ORAL DAILY
Qty: 30 TABLET | Refills: 0 | Status: SHIPPED | OUTPATIENT
Start: 2019-10-03 | End: 2019-11-02

## 2019-10-03 RX ORDER — ONDANSETRON 4 MG/1
4 TABLET, ORALLY DISINTEGRATING ORAL EVERY 4 HOURS PRN
Qty: 10 TABLET | Refills: 0 | Status: SHIPPED | OUTPATIENT
Start: 2019-10-03 | End: 2019-10-10

## 2019-10-03 RX ORDER — FUROSEMIDE 20 MG/1
20 TABLET ORAL 2 TIMES DAILY
COMMUNITY
End: 2020-03-23

## 2019-10-03 RX ORDER — DICYCLOMINE HCL 20 MG
20 TABLET ORAL ONCE
Status: COMPLETED | OUTPATIENT
Start: 2019-10-03 | End: 2019-10-03

## 2019-10-03 RX ORDER — DICYCLOMINE HYDROCHLORIDE 10 MG/1
10 CAPSULE ORAL ONCE
Status: DISCONTINUED | OUTPATIENT
Start: 2019-10-03 | End: 2019-10-03

## 2019-10-03 RX ORDER — LOSARTAN POTASSIUM 100 MG/1
100 TABLET ORAL ONCE
Status: DISCONTINUED | OUTPATIENT
Start: 2019-10-03 | End: 2019-10-03

## 2019-10-03 RX ORDER — HYDRALAZINE HYDROCHLORIDE 25 MG/1
25 TABLET, FILM COATED ORAL 3 TIMES DAILY
Status: ON HOLD | COMMUNITY
End: 2021-02-13

## 2019-10-03 RX ORDER — LOSARTAN POTASSIUM 100 MG/1
100 TABLET ORAL ONCE
Status: COMPLETED | OUTPATIENT
Start: 2019-10-03 | End: 2019-10-03

## 2019-10-03 RX ORDER — LOSARTAN POTASSIUM 100 MG/1
TABLET ORAL DAILY
COMMUNITY
End: 2020-03-23

## 2019-10-03 RX ORDER — CARVEDILOL 25 MG/1
25 TABLET ORAL 2 TIMES DAILY WITH MEALS
Status: ON HOLD | COMMUNITY
End: 2021-02-13

## 2019-10-03 RX ORDER — DILTIAZEM HYDROCHLORIDE 5 MG/ML
20 INJECTION INTRAVENOUS ONCE
Status: COMPLETED | OUTPATIENT
Start: 2019-10-03 | End: 2019-10-03

## 2019-10-03 RX ORDER — DICYCLOMINE HCL 20 MG
20 TABLET ORAL 4 TIMES DAILY PRN
Qty: 30 TABLET | Refills: 0 | Status: SHIPPED | OUTPATIENT
Start: 2019-10-03 | End: 2019-11-02

## 2019-10-03 RX ORDER — DILTIAZEM HYDROCHLORIDE 60 MG/1
60 TABLET, FILM COATED ORAL 4 TIMES DAILY
Status: ON HOLD | COMMUNITY
End: 2021-02-13

## 2019-10-03 RX ORDER — HYDRALAZINE HYDROCHLORIDE 10 MG/1
10 TABLET, FILM COATED ORAL ONCE
Status: COMPLETED | OUTPATIENT
Start: 2019-10-03 | End: 2019-10-03

## 2019-10-03 RX ORDER — CARVEDILOL 25 MG/1
25 TABLET ORAL ONCE
Status: COMPLETED | OUTPATIENT
Start: 2019-10-03 | End: 2019-10-03

## 2019-10-03 NOTE — ED PROVIDER NOTES
Patient Seen in: [facility] AND [facility] Emergency Department      History   Patient presents with:  Nausea/vomiting  Abdominal Pain    Stated Complaint: n/v, abd pain    HPI    31-year-old male presents for complaint of nausea vomiting for the past day.   Margot Exam     ED Triage Vitals   BP 10/03/19 1119 (!) 223/110   Pulse 10/03/19 1119 84   Resp 10/03/19 1119 18   Temp 10/03/19 1203 99.7 °F (37.6 °C)   Temp src 10/03/19 1203 Tympanic   SpO2 10/03/19 1119 98 %   O2 Device --        Current:BP (!) 195/117   Puls Direct 0.4 (*)     All other components within normal limits   LIPASE - Abnormal; Notable for the following components:    Lipase 72 (*)     All other components within normal limits   CBC W/ DIFFERENTIAL - Abnormal; Notable for the following components: images of the abdomen and pelvis were obtained with non-ionic intravenous contrast material.  Automated exposure control for dose reduction was used. Adjustment of the mA and/or kV was done based on the patient's size.  Use of iterative reconstruction techn vessel disease and wall thickening of the bilateral lower lobe bronchi suggestive of chronic airway inflammation. CONCLUSION:   Motion degraded exam.  Wall thickening seen at the gastric pylorus and antrum suggestive of gastritis.  If symptoms pers tablet Refills: 0    Dicyclomine HCl 20 MG Oral Tab  Take 1 tablet (20 mg total) by mouth 4 (four) times daily as needed. Qty: 30 tablet Refills: 0    Pantoprazole Sodium 40 MG Oral Tab EC  Take 1 tablet (40 mg total) by mouth daily.   Qty: 30 tablet Refil

## 2019-11-28 NOTE — TELEPHONE ENCOUNTER
To reception staff, pls call pt for appt. Review pended refill request as it does not fall under a protocol.   Requested Prescriptions     Pending Prescriptions Disp Refills   • XARELTO 20 MG Oral Tab [Pharmacy Med Name: XARELTO 20MG TABLETS] 30 tablet

## 2019-11-30 RX ORDER — RIVAROXABAN 20 MG/1
TABLET, FILM COATED ORAL
Qty: 90 TABLET | Refills: 1 | Status: SHIPPED | OUTPATIENT
Start: 2019-11-30 | End: 2021-02-11

## 2020-03-23 ENCOUNTER — TELEPHONE (OUTPATIENT)
Dept: INTERNAL MEDICINE CLINIC | Facility: CLINIC | Age: 55
End: 2020-03-23

## 2020-03-23 RX ORDER — FUROSEMIDE 20 MG/1
TABLET ORAL
Qty: 180 TABLET | Refills: 0 | Status: ON HOLD | OUTPATIENT
Start: 2020-03-23 | End: 2021-02-13

## 2020-03-23 RX ORDER — LOSARTAN POTASSIUM 100 MG/1
100 TABLET ORAL DAILY
Qty: 90 TABLET | Refills: 2 | Status: ON HOLD | OUTPATIENT
Start: 2020-03-23 | End: 2021-02-13

## 2020-12-01 ENCOUNTER — TELEPHONE (OUTPATIENT)
Dept: INTERNAL MEDICINE CLINIC | Facility: CLINIC | Age: 55
End: 2020-12-01

## 2020-12-11 ENCOUNTER — TELEPHONE (OUTPATIENT)
Dept: INTERNAL MEDICINE CLINIC | Facility: CLINIC | Age: 55
End: 2020-12-11

## 2020-12-11 NOTE — TELEPHONE ENCOUNTER
Dr. Aly Peter, spoke to pharmacy who states refill request was sent for Diltiazem HCL ER beads 360 mg and Hydralazine 10 mg. Both medications were prescribed after hospital discharge in September. Patient picked up a 30 day supply of medications in September. Please advise on medications.

## 2020-12-11 NOTE — TELEPHONE ENCOUNTER
I cannot prescribe medications without having seen the patient. I do not know what his numbers look like. He is extremely noncompliant and high risk to himself. He should return to the emergency room for evaluation before medications. Or he should set up an appointment ASAP with anybody available at the clinic for evaluation.

## 2020-12-14 NOTE — TELEPHONE ENCOUNTER
Spoke with pt and MD message given. Pt does not prefer to schedule with a different provider. Pt asking if Dr Bernardo Wang can add him to her schedule for first thing in the morning.        Please advise

## 2021-02-11 ENCOUNTER — TELEPHONE (OUTPATIENT)
Dept: INTERNAL MEDICINE CLINIC | Facility: CLINIC | Age: 56
End: 2021-02-11

## 2021-02-11 ENCOUNTER — APPOINTMENT (OUTPATIENT)
Dept: GENERAL RADIOLOGY | Facility: HOSPITAL | Age: 56
DRG: 291 | End: 2021-02-11
Attending: EMERGENCY MEDICINE
Payer: COMMERCIAL

## 2021-02-11 ENCOUNTER — HOSPITAL ENCOUNTER (INPATIENT)
Facility: HOSPITAL | Age: 56
LOS: 2 days | Discharge: HOME | DRG: 291 | End: 2021-02-13
Attending: EMERGENCY MEDICINE | Admitting: HOSPITALIST
Payer: COMMERCIAL

## 2021-02-11 DIAGNOSIS — I50.9 ACUTE ON CHRONIC CONGESTIVE HEART FAILURE, UNSPECIFIED HEART FAILURE TYPE (HCC): Primary | ICD-10-CM

## 2021-02-11 DIAGNOSIS — N17.9 ACUTE KIDNEY INJURY (HCC): ICD-10-CM

## 2021-02-11 DIAGNOSIS — I48.20 ATRIAL FIBRILLATION, CHRONIC (HCC): ICD-10-CM

## 2021-02-11 PROBLEM — I50.33 ACUTE ON CHRONIC DIASTOLIC (CONGESTIVE) HEART FAILURE (HCC): Status: ACTIVE | Noted: 2021-02-11

## 2021-02-11 LAB
ANION GAP SERPL CALC-SCNC: 5 MMOL/L (ref 0–18)
BACTERIA UR QL AUTO: NEGATIVE /HPF
BASOPHILS # BLD AUTO: 0.08 X10(3) UL (ref 0–0.2)
BASOPHILS NFR BLD AUTO: 0.9 %
BILIRUB UR QL: NEGATIVE
BUN BLD-MCNC: 30 MG/DL (ref 7–18)
BUN/CREAT SERPL: 15.8 (ref 10–20)
CALCIUM BLD-MCNC: 8.8 MG/DL (ref 8.5–10.1)
CHLORIDE SERPL-SCNC: 110 MMOL/L (ref 98–112)
CLARITY UR: CLEAR
CO2 SERPL-SCNC: 28 MMOL/L (ref 21–32)
COLOR UR: YELLOW
CREAT BLD-MCNC: 1.9 MG/DL
DEPRECATED RDW RBC AUTO: 45.8 FL (ref 35.1–46.3)
EOSINOPHIL # BLD AUTO: 0.25 X10(3) UL (ref 0–0.7)
EOSINOPHIL NFR BLD AUTO: 2.9 %
ERYTHROCYTE [DISTWIDTH] IN BLOOD BY AUTOMATED COUNT: 14.2 % (ref 11–15)
GLUCOSE BLD-MCNC: 134 MG/DL (ref 70–99)
GLUCOSE UR-MCNC: NEGATIVE MG/DL
HCT VFR BLD AUTO: 39.4 %
HGB BLD-MCNC: 12.4 G/DL
HGB UR QL STRIP.AUTO: NEGATIVE
IMM GRANULOCYTES # BLD AUTO: 0.04 X10(3) UL (ref 0–1)
IMM GRANULOCYTES NFR BLD: 0.5 %
KETONES UR-MCNC: NEGATIVE MG/DL
LEUKOCYTE ESTERASE UR QL STRIP.AUTO: NEGATIVE
LYMPHOCYTES # BLD AUTO: 1.73 X10(3) UL (ref 1–4)
LYMPHOCYTES NFR BLD AUTO: 20.4 %
MCH RBC QN AUTO: 28.2 PG (ref 26–34)
MCHC RBC AUTO-ENTMCNC: 31.5 G/DL (ref 31–37)
MCV RBC AUTO: 89.5 FL
MONOCYTES # BLD AUTO: 0.57 X10(3) UL (ref 0.1–1)
MONOCYTES NFR BLD AUTO: 6.7 %
NEUTROPHILS # BLD AUTO: 5.81 X10 (3) UL (ref 1.5–7.7)
NEUTROPHILS # BLD AUTO: 5.81 X10(3) UL (ref 1.5–7.7)
NEUTROPHILS NFR BLD AUTO: 68.6 %
NITRITE UR QL STRIP.AUTO: NEGATIVE
NT-PROBNP SERPL-MCNC: 2322 PG/ML (ref ?–125)
OSMOLALITY SERPL CALC.SUM OF ELEC: 304 MOSM/KG (ref 275–295)
PH UR: 7 [PH] (ref 5–8)
PLATELET # BLD AUTO: 166 10(3)UL (ref 150–450)
POTASSIUM SERPL-SCNC: 3.7 MMOL/L (ref 3.5–5.1)
PROT UR-MCNC: 100 MG/DL
RBC # BLD AUTO: 4.4 X10(6)UL
RBC #/AREA URNS AUTO: 1 /HPF
SARS-COV-2 RNA RESP QL NAA+PROBE: NOT DETECTED
SODIUM SERPL-SCNC: 143 MMOL/L (ref 136–145)
SP GR UR STRIP: 1.01 (ref 1–1.03)
TROPONIN I SERPL-MCNC: <0.045 NG/ML (ref ?–0.04)
UROBILINOGEN UR STRIP-ACNC: 2
WBC # BLD AUTO: 8.5 X10(3) UL (ref 4–11)
WBC #/AREA URNS AUTO: 1 /HPF

## 2021-02-11 PROCEDURE — 71045 X-RAY EXAM CHEST 1 VIEW: CPT | Performed by: EMERGENCY MEDICINE

## 2021-02-11 PROCEDURE — 99223 1ST HOSP IP/OBS HIGH 75: CPT | Performed by: HOSPITALIST

## 2021-02-11 PROCEDURE — 99254 IP/OBS CNSLTJ NEW/EST MOD 60: CPT | Performed by: INTERNAL MEDICINE

## 2021-02-11 RX ORDER — TEMAZEPAM 7.5 MG/1
15 CAPSULE ORAL NIGHTLY PRN
Status: DISCONTINUED | OUTPATIENT
Start: 2021-02-11 | End: 2021-02-13

## 2021-02-11 RX ORDER — ACETAMINOPHEN 325 MG/1
650 TABLET ORAL EVERY 6 HOURS PRN
Status: DISCONTINUED | OUTPATIENT
Start: 2021-02-11 | End: 2021-02-13

## 2021-02-11 RX ORDER — HYDRALAZINE HYDROCHLORIDE 25 MG/1
25 TABLET, FILM COATED ORAL EVERY 8 HOURS SCHEDULED
Status: DISCONTINUED | OUTPATIENT
Start: 2021-02-11 | End: 2021-02-12

## 2021-02-11 RX ORDER — FUROSEMIDE 10 MG/ML
40 INJECTION INTRAMUSCULAR; INTRAVENOUS ONCE
Status: COMPLETED | OUTPATIENT
Start: 2021-02-11 | End: 2021-02-11

## 2021-02-11 RX ORDER — HYDRALAZINE HYDROCHLORIDE 20 MG/ML
5 INJECTION INTRAMUSCULAR; INTRAVENOUS ONCE
Status: COMPLETED | OUTPATIENT
Start: 2021-02-11 | End: 2021-02-11

## 2021-02-11 RX ORDER — CARVEDILOL 25 MG/1
25 TABLET ORAL 2 TIMES DAILY WITH MEALS
Status: DISCONTINUED | OUTPATIENT
Start: 2021-02-11 | End: 2021-02-13

## 2021-02-11 RX ORDER — NITROGLYCERIN 0.4 MG/1
0.4 TABLET SUBLINGUAL EVERY 5 MIN PRN
Status: DISCONTINUED | OUTPATIENT
Start: 2021-02-11 | End: 2021-02-13

## 2021-02-11 RX ORDER — ONDANSETRON 2 MG/ML
4 INJECTION INTRAMUSCULAR; INTRAVENOUS EVERY 6 HOURS PRN
Status: DISCONTINUED | OUTPATIENT
Start: 2021-02-11 | End: 2021-02-13

## 2021-02-11 RX ORDER — BUMETANIDE 0.25 MG/ML
2 INJECTION, SOLUTION INTRAMUSCULAR; INTRAVENOUS
Status: DISCONTINUED | OUTPATIENT
Start: 2021-02-11 | End: 2021-02-13

## 2021-02-11 RX ORDER — HYDRALAZINE HYDROCHLORIDE 20 MG/ML
20 INJECTION INTRAMUSCULAR; INTRAVENOUS EVERY 4 HOURS PRN
Status: DISCONTINUED | OUTPATIENT
Start: 2021-02-11 | End: 2021-02-13

## 2021-02-11 RX ORDER — SODIUM CHLORIDE 0.9 % (FLUSH) 0.9 %
10 SYRINGE (ML) INJECTION AS NEEDED
Status: DISCONTINUED | OUTPATIENT
Start: 2021-02-11 | End: 2021-02-13

## 2021-02-11 RX ORDER — PROCHLORPERAZINE EDISYLATE 5 MG/ML
5 INJECTION INTRAMUSCULAR; INTRAVENOUS EVERY 8 HOURS PRN
Status: DISCONTINUED | OUTPATIENT
Start: 2021-02-11 | End: 2021-02-13

## 2021-02-11 NOTE — CONSULTS
[facility]    Cardiology Consultation  [facility]    Bhumika Pierce Patient Status:  Inpatient    1965 MRN [id number]   Location [facility] 3W/SW Attending Luis Almodovar MD   Hosp Day # 0 PCP An Diagnosis Date   • Arrhythmia    • Atrial fibrillation (HCC)    • Diastolic heart failure (HCC)    • ABDILLO (dyspnea on exertion)    • Essential hypertension    • High blood pressure    • Pulmonary HTN (HCC)        Past Surgical History  History reviewed.  Stephania Da Silva food.        Allergies  No Known Allergies    Review of Systems:   See above and below  Review of Systems   Respiratory: Positive for shortness of breath. Cardiovascular: Positive for orthopnea and leg swelling. Negative for chest pain.    All other syst 02/11/2021    CREATSERUM 1.90 (H) 02/11/2021    BUN 30 (H) 02/11/2021     02/11/2021    K 3.7 02/11/2021     02/11/2021    CO2 28.0 02/11/2021     (H) 02/11/2021    CA 8.8 02/11/2021    ALB 3.8 10/03/2019    ALKPHO 87 10/03/2019    TP 7. dose Xarelto, and hypertension, rate controlled. We will repeat echocardiogram.    Thank you for allowing me to participate in the care of your patient.     Remberto North  2/11/2021

## 2021-02-11 NOTE — ED NOTES
Orders for admission, patient is aware of plan and ready to go upstairs. Any questions, please call ED RN Han Palacio  at extension [phone]. Pt comes from home with c/o sob, cough, and lower extremity edema. Pt is being admitted with CHF exacerbation.    Pt is A

## 2021-02-11 NOTE — H&P
[facility]    PATIENT'S NAME: DELMI RAJPUT   ATTENDING PHYSICIAN: Adama Alfonso.  Jhonatan Guerrero MD   PATIENT ACCOUNT#:   [id number]    LOCATION:  [address]  MEDICAL RECORD #:   [id number]       YOB: 1965  ADMISSION DATE:       0 of daily living. REVIEW OF SYSTEMS:  The patient reports that he had run out of Xarelto, but he continues to take furosemide, hydralazine, and carvedilol. He also ran out of losartan.   He, for the last 2 days, has been noticing that his dependent edema

## 2021-02-11 NOTE — TELEPHONE ENCOUNTER
Pt calling back from 12/11/20- was advised to go to ER, stated he did not go due to covid situation, s/s swelling in feet/ankles, b/p 172/128,sob with walking, no energy, don't feel well- advised should go to ER as advised for cardiac work up, then call ba

## 2021-02-11 NOTE — ED PROVIDER NOTES
Patient Seen in: [facility] AND [facility] Emergency Department      History   No chief complaint on file.     Stated Complaint: Cough    HPI/Subjective:   HPI    Patient presents to the emergency department with shortness of breath, swelling in his legs and brandee He is not in acute distress. Appearance: He is well-developed. HENT:      Head: Normocephalic.       Nose: Nose normal.      Mouth/Throat:      Mouth: Mucous membranes are moist.   Eyes:      Conjunctiva/sclera: Conjunctivae normal.      Pupils: Pupil DIFFERENTIAL - Abnormal; Notable for the following components:    HGB 12.4 (*)     All other components within normal limits   TROPONIN I - Normal   RAPID SARS-COV-2 BY PCR - Normal   CBC WITH DIFFERENTIAL WITH PLATELET    Narrative:      The following orde fibrillation, chronic ([facility] [address]  Acute kidney injury [facility])    Disposition:  Admit  2/11/2021 11:58 am    Follow-up:  No follow-up provider specified.         Medications Prescribed:  Current Discharge Medication List                          Present on Admissio

## 2021-02-11 NOTE — SPIRITUAL CARE NOTE
Pt, was seen briefly by the . It was not an extensive visit because the nurse had to do a medical procedure and the Pt had to have lunch which because he had not eaten.

## 2021-02-12 ENCOUNTER — APPOINTMENT (OUTPATIENT)
Dept: CV DIAGNOSTICS | Facility: HOSPITAL | Age: 56
DRG: 291 | End: 2021-02-12
Attending: HOSPITALIST
Payer: COMMERCIAL

## 2021-02-12 LAB
ANION GAP SERPL CALC-SCNC: 4 MMOL/L (ref 0–18)
BASOPHILS # BLD AUTO: 0.1 X10(3) UL (ref 0–0.2)
BASOPHILS NFR BLD AUTO: 1.2 %
BUN BLD-MCNC: 30 MG/DL (ref 7–18)
BUN/CREAT SERPL: 17.5 (ref 10–20)
CALCIUM BLD-MCNC: 8.4 MG/DL (ref 8.5–10.1)
CHLORIDE SERPL-SCNC: 109 MMOL/L (ref 98–112)
CO2 SERPL-SCNC: 30 MMOL/L (ref 21–32)
CREAT BLD-MCNC: 1.71 MG/DL
DEPRECATED RDW RBC AUTO: 45.4 FL (ref 35.1–46.3)
EOSINOPHIL # BLD AUTO: 0.29 X10(3) UL (ref 0–0.7)
EOSINOPHIL NFR BLD AUTO: 3.4 %
ERYTHROCYTE [DISTWIDTH] IN BLOOD BY AUTOMATED COUNT: 14.3 % (ref 11–15)
GLUCOSE BLD-MCNC: 107 MG/DL (ref 70–99)
HAV IGM SER QL: 2.1 MG/DL (ref 1.6–2.6)
HCT VFR BLD AUTO: 39 %
HGB BLD-MCNC: 12.5 G/DL
IMM GRANULOCYTES # BLD AUTO: 0.03 X10(3) UL (ref 0–1)
IMM GRANULOCYTES NFR BLD: 0.4 %
LYMPHOCYTES # BLD AUTO: 1.88 X10(3) UL (ref 1–4)
LYMPHOCYTES NFR BLD AUTO: 22.3 %
MCH RBC QN AUTO: 28.2 PG (ref 26–34)
MCHC RBC AUTO-ENTMCNC: 32.1 G/DL (ref 31–37)
MCV RBC AUTO: 87.8 FL
MONOCYTES # BLD AUTO: 0.7 X10(3) UL (ref 0.1–1)
MONOCYTES NFR BLD AUTO: 8.3 %
NEUTROPHILS # BLD AUTO: 5.42 X10 (3) UL (ref 1.5–7.7)
NEUTROPHILS # BLD AUTO: 5.42 X10(3) UL (ref 1.5–7.7)
NEUTROPHILS NFR BLD AUTO: 64.4 %
OSMOLALITY SERPL CALC.SUM OF ELEC: 303 MOSM/KG (ref 275–295)
PLATELET # BLD AUTO: 175 10(3)UL (ref 150–450)
POTASSIUM SERPL-SCNC: 3.6 MMOL/L (ref 3.5–5.1)
POTASSIUM SERPL-SCNC: 3.9 MMOL/L (ref 3.5–5.1)
RBC # BLD AUTO: 4.44 X10(6)UL
SODIUM SERPL-SCNC: 143 MMOL/L (ref 136–145)
WBC # BLD AUTO: 8.4 X10(3) UL (ref 4–11)

## 2021-02-12 PROCEDURE — 99232 SBSQ HOSP IP/OBS MODERATE 35: CPT | Performed by: NURSE PRACTITIONER

## 2021-02-12 PROCEDURE — 93306 TTE W/DOPPLER COMPLETE: CPT | Performed by: HOSPITALIST

## 2021-02-12 PROCEDURE — 99233 SBSQ HOSP IP/OBS HIGH 50: CPT | Performed by: HOSPITALIST

## 2021-02-12 RX ORDER — POTASSIUM CHLORIDE 20 MEQ/1
40 TABLET, EXTENDED RELEASE ORAL EVERY 4 HOURS
Status: COMPLETED | OUTPATIENT
Start: 2021-02-12 | End: 2021-02-12

## 2021-02-12 RX ORDER — HYDRALAZINE HYDROCHLORIDE 50 MG/1
50 TABLET, FILM COATED ORAL EVERY 8 HOURS SCHEDULED
Status: DISCONTINUED | OUTPATIENT
Start: 2021-02-12 | End: 2021-02-13

## 2021-02-12 NOTE — PLAN OF CARE
AOX4, RA, afib on tele, independent to bathroom. HR increase with exertion, 120-140's, cpap at night, IV bumex BID.  Plan for echo today    Problem: Patient Centered Care  Goal: Patient preferences are identified and integrated in the patient's plan of care Absence of cardiac arrhythmias or at baseline  Description: INTERVENTIONS:  - Continuous cardiac monitoring, monitor vital signs, obtain 12 lead EKG if indicated  - Evaluate effectiveness of antiarrhythmic and heart rate control medications as ordered  - I needs related to functional status, cognitive ability or social support system  Outcome: Progressing

## 2021-02-12 NOTE — PROGRESS NOTES
· [facility]      Subjective:  Dyspnea resolved, still with LE edema.   Wants to go home    Objective:  BP (!) 185/113 (BP Location: Right arm)   Pulse 88   Temp 97.9 °F (36.6 °C) (Oral)   Resp 20   Ht 6' 1\" (1.854 m)   Wt 261 lb 1.6 oz (118.4

## 2021-02-12 NOTE — PLAN OF CARE
BP was elevated today. PRN meds given. Strict I+O. Room air. Continue to monitor.      Problem: Patient Centered Care  Goal: Patient preferences are identified and integrated in the patient's plan of care  Description: Interventions:  - What would you like baseline  Description: INTERVENTIONS:  - Continuous cardiac monitoring, monitor vital signs, obtain 12 lead EKG if indicated  - Evaluate effectiveness of antiarrhythmic and heart rate control medications as ordered  - Initiate emergency measures for life t cognitive ability or social support system  Outcome: Progressing

## 2021-02-12 NOTE — PROGRESS NOTES
[facility]  Hospitalist Progress Note     Monster Ripple Patient Status:  Inpatient    1965  48year old CSN [id number]   Location 311/311-A Attending Jordan Dixon MD   Hosp Day # 1 PCP Cruz Romero MD     ASSESSMENT/PLAN    A MCHC 31.5 32.1   RDW 14.2 14.3   NEPRELIM 5.81 5.42   WBC 8.5 8.4   .0 175.0     Recent Labs   Lab 02/11/21  1028 02/12/21  0649   * 107*   BUN 30* 30*   CREATSERUM 1.90* 1.71*   GFRAA 45* 51*   GFRNAA 39* 44*   CA 8.8 8.4*    143   K

## 2021-02-12 NOTE — PLAN OF CARE
IV bumex. Strict I+O. Room air. No complaints at this time. Continue to monitor. Plan for echo.      Problem: Patient Centered Care  Goal: Patient preferences are identified and integrated in the patient's plan of care  Description: Interventions:  - What w baseline  Description: INTERVENTIONS:  - Continuous cardiac monitoring, monitor vital signs, obtain 12 lead EKG if indicated  - Evaluate effectiveness of antiarrhythmic and heart rate control medications as ordered  - Initiate emergency measures for life t cognitive ability or social support system  Outcome: Progressing

## 2021-02-13 VITALS
TEMPERATURE: 98 F | BODY MASS INDEX: 34.61 KG/M2 | DIASTOLIC BLOOD PRESSURE: 104 MMHG | OXYGEN SATURATION: 99 % | SYSTOLIC BLOOD PRESSURE: 152 MMHG | HEIGHT: 73 IN | HEART RATE: 94 BPM | RESPIRATION RATE: 18 BRPM | WEIGHT: 261.13 LBS

## 2021-02-13 LAB
ANION GAP SERPL CALC-SCNC: 4 MMOL/L (ref 0–18)
BUN BLD-MCNC: 33 MG/DL (ref 7–18)
BUN/CREAT SERPL: 20.8 (ref 10–20)
CALCIUM BLD-MCNC: 9.2 MG/DL (ref 8.5–10.1)
CHLORIDE SERPL-SCNC: 109 MMOL/L (ref 98–112)
CO2 SERPL-SCNC: 30 MMOL/L (ref 21–32)
CREAT BLD-MCNC: 1.59 MG/DL
GLUCOSE BLD-MCNC: 121 MG/DL (ref 70–99)
OSMOLALITY SERPL CALC.SUM OF ELEC: 305 MOSM/KG (ref 275–295)
POTASSIUM SERPL-SCNC: 3.7 MMOL/L (ref 3.5–5.1)
SODIUM SERPL-SCNC: 143 MMOL/L (ref 136–145)

## 2021-02-13 PROCEDURE — 99239 HOSP IP/OBS DSCHRG MGMT >30: CPT | Performed by: HOSPITALIST

## 2021-02-13 RX ORDER — TORSEMIDE 20 MG/1
20 TABLET ORAL 2 TIMES DAILY
Qty: 60 TABLET | Refills: 0 | Status: SHIPPED | OUTPATIENT
Start: 2021-02-13 | End: 2021-02-20

## 2021-02-13 RX ORDER — HYDRALAZINE HYDROCHLORIDE 25 MG/1
50 TABLET, FILM COATED ORAL 3 TIMES DAILY
Qty: 180 TABLET | Refills: 0 | Status: SHIPPED | OUTPATIENT
Start: 2021-02-13 | End: 2021-02-20

## 2021-02-13 RX ORDER — CARVEDILOL 25 MG/1
25 TABLET ORAL 2 TIMES DAILY WITH MEALS
Qty: 60 TABLET | Refills: 0 | Status: SHIPPED | OUTPATIENT
Start: 2021-02-13 | End: 2021-02-20

## 2021-02-13 NOTE — DISCHARGE SUMMARY
[facility] HOSPITALIST  DISCHARGE SUMMARY     Ryan Rodgers Patient Status:  Inpatient    1965 MRN [id number]   Location [facility] 3W/SW Attending No att. providers found   [facility] Day # 2 PCP Lavonne Woods MD     DATE OF ADMISSION: 20 discharge, shortness of breath, chest pain, new neurologic symptoms, other concerning symptoms.     PHYSICAL EXAM:  Temp:  [98.1 °F (36.7 °C)-98.4 °F (36.9 °C)] 98.1 °F (36.7 °C)  Pulse:  [] 94  Resp:  [18] 18  BP: (152-187)/() 152/104  Gen: A+O 60 tablet  Refills: 0     Multiple Vitamin Tabs      Take 1 tablet by mouth daily.    Quantity: 30 tablet  Refills: 0        STOP taking these medications    dilTIAZem HCl 60 MG Tabs  Commonly known as: cardIZEM        furosemide 20 MG Tabs  Commonly known

## 2021-02-13 NOTE — PLAN OF CARE
Problem: Patient Centered Care  Goal: Patient preferences are identified and integrated in the patient's plan of care  Description: Interventions:  - What would you like us to know as we care for you?   - Provide timely, complete, and accurate informatio monitoring, monitor vital signs, obtain 12 lead EKG if indicated  - Evaluate effectiveness of antiarrhythmic and heart rate control medications as ordered  - Initiate emergency measures for life threatening arrhythmias  - Monitor electrolytes and administe system  Outcome: Adequate for Discharge

## 2021-02-13 NOTE — PLAN OF CARE
AOX4, RA, afib on tele, HR elevated when ambulating (130-150's), BP elevated, gave tylenol for headache and restoril for sleep.  Will continue to monitor    Problem: Patient Centered Care  Goal: Patient preferences are identified and integrated in the patie Progressing  Goal: Absence of cardiac arrhythmias or at baseline  Description: INTERVENTIONS:  - Continuous cardiac monitoring, monitor vital signs, obtain 12 lead EKG if indicated  - Evaluate effectiveness of antiarrhythmic and heart rate control medicati order or complex needs related to functional status, cognitive ability or social support system  Outcome: Progressing

## 2021-02-15 ENCOUNTER — PATIENT OUTREACH (OUTPATIENT)
Dept: CASE MANAGEMENT | Age: 56
End: 2021-02-15

## 2021-02-15 NOTE — PROGRESS NOTES
1st attempt SCC/HF and PCP apt request    [facility]  [address]  [phone]  [address]  Apt made for Wed. Feb. 17th @10am      [facility]  [address]

## 2021-02-15 NOTE — PROGRESS NOTES
LM for pt to call [facility] for TCM since discharge. [facility] phone number was provided for pt to call back.

## 2021-02-16 ENCOUNTER — TELEPHONE (OUTPATIENT)
Dept: INTERNAL MEDICINE CLINIC | Facility: CLINIC | Age: 56
End: 2021-02-16

## 2021-02-16 NOTE — PROGRESS NOTES
[facility]    Daryle Chandler Patient Status:  No patient class for patient encounter    1965 MRN [id number]   Location [facility] MD Dr. Ozzie Jacques is a 48year old 10:15 AM     02/17/2021 10:15 AM    CO2 33.0 (H) 02/17/2021 10:15 AM     (H) 02/17/2021 10:15 AM    CA 9.1 02/17/2021 10:15 AM    ALB 3.8 10/03/2019 11:04 AM    ALKPHO 87 10/03/2019 11:04 AM    BILT 1.2 10/03/2019 11:04 AM    TP 7.9 10/03/2019 family receptive.     Assessment:  Acute on chronic HF p EF, NYHA class III, stage C with severe LVH  -diuretics : torsemide 20 mg bid  -diuresed 7 liters /9 #  -denies BADILLO, LE edema decreased , denies orthopnea, + bloating, , wearing cpap nightly   - BP el [phone]    Follow up with the specialty care clinic on 3/31/21 at 8 am    Check home blood pressure 3 times a week and record on chart and bring when seeing Dr. Shauna Estrada on 3/4/21     Blood test for basic metabolic panel in 1 week, no appointment is nee

## 2021-02-16 NOTE — PROGRESS NOTES
NCM attempted to contact patient for hospital follow up. Unable to leave message as mailbox is not set up. NCM sent TE to PCP office re: assistance in scheduling sooner TCM HFU appt.

## 2021-02-16 NOTE — TELEPHONE ENCOUNTER
Pt discharged 2-13-21, acute CHF. Pt is in TCM and has FU appt scheduled for 3-4-21 which is outside TCM timeframe. NCM called twice to attempt to complete TCM call but did not reach pt. TCM/HFU appt recommended by 2-20-21 as pt is a HIGH risk for readmission. Book by date for TCM: 2-27-21    Please follow up with pt and try to schedule as he would greatly benefit from TCM/HFU appt! Thank you!

## 2021-02-17 ENCOUNTER — OFFICE VISIT (OUTPATIENT)
Dept: CARDIOLOGY CLINIC | Facility: HOSPITAL | Age: 56
End: 2021-02-17
Attending: NURSE PRACTITIONER
Payer: COMMERCIAL

## 2021-02-17 VITALS
DIASTOLIC BLOOD PRESSURE: 98 MMHG | BODY MASS INDEX: 35 KG/M2 | OXYGEN SATURATION: 99 % | HEART RATE: 100 BPM | SYSTOLIC BLOOD PRESSURE: 180 MMHG | WEIGHT: 264 LBS

## 2021-02-17 DIAGNOSIS — I10 ESSENTIAL HYPERTENSION: ICD-10-CM

## 2021-02-17 DIAGNOSIS — N18.32 STAGE 3B CHRONIC KIDNEY DISEASE (HCC): ICD-10-CM

## 2021-02-17 DIAGNOSIS — I50.9 HEART FAILURE, UNSPECIFIED (HCC): ICD-10-CM

## 2021-02-17 DIAGNOSIS — I50.33 ACUTE ON CHRONIC HEART FAILURE WITH PRESERVED EJECTION FRACTION (HFPEF) (HCC): Primary | ICD-10-CM

## 2021-02-17 LAB
ANION GAP SERPL CALC-SCNC: 3 MMOL/L (ref 0–18)
BUN BLD-MCNC: 42 MG/DL (ref 7–18)
BUN/CREAT SERPL: 19.8 (ref 10–20)
CALCIUM BLD-MCNC: 9.1 MG/DL (ref 8.5–10.1)
CHLORIDE SERPL-SCNC: 106 MMOL/L (ref 98–112)
CO2 SERPL-SCNC: 33 MMOL/L (ref 21–32)
CREAT BLD-MCNC: 2.12 MG/DL
GLUCOSE BLD-MCNC: 171 MG/DL (ref 70–99)
NT-PROBNP SERPL-MCNC: 1807 PG/ML (ref ?–125)
OSMOLALITY SERPL CALC.SUM OF ELEC: 309 MOSM/KG (ref 275–295)
PATIENT FASTING Y/N/NP: NO
POTASSIUM SERPL-SCNC: 3.8 MMOL/L (ref 3.5–5.1)
SODIUM SERPL-SCNC: 142 MMOL/L (ref 136–145)

## 2021-02-17 PROCEDURE — 83880 ASSAY OF NATRIURETIC PEPTIDE: CPT | Performed by: NURSE PRACTITIONER

## 2021-02-17 PROCEDURE — 99202 OFFICE O/P NEW SF 15 MIN: CPT | Performed by: NURSE PRACTITIONER

## 2021-02-17 PROCEDURE — 80048 BASIC METABOLIC PNL TOTAL CA: CPT | Performed by: NURSE PRACTITIONER

## 2021-02-17 PROCEDURE — 36415 COLL VENOUS BLD VENIPUNCTURE: CPT | Performed by: NURSE PRACTITIONER

## 2021-02-17 PROCEDURE — 99204 OFFICE O/P NEW MOD 45 MIN: CPT | Performed by: NURSE PRACTITIONER

## 2021-02-17 NOTE — PATIENT INSTRUCTIONS
Increase hydralazine to 50 mg ( 2 of the 25 mg tabs) 3 times a day, take a 25 mg tablet right when you get home    Continue all your same medications    Call if having any dizziness, lightheadedness, heart racing, palpitations, chest pain, shortness of falguni

## 2021-02-20 ENCOUNTER — TELEPHONE (OUTPATIENT)
Dept: INTERNAL MEDICINE CLINIC | Facility: CLINIC | Age: 56
End: 2021-02-20

## 2021-02-20 RX ORDER — HYDRALAZINE HYDROCHLORIDE 25 MG/1
50 TABLET, FILM COATED ORAL 3 TIMES DAILY
Qty: 180 TABLET | Refills: 3 | Status: SHIPPED | OUTPATIENT
Start: 2021-02-20 | End: 2022-01-24

## 2021-02-20 RX ORDER — TORSEMIDE 20 MG/1
20 TABLET ORAL 2 TIMES DAILY
Qty: 60 TABLET | Refills: 3 | Status: SHIPPED | OUTPATIENT
Start: 2021-02-20 | End: 2022-01-24

## 2021-02-20 RX ORDER — CARVEDILOL 25 MG/1
25 TABLET ORAL 2 TIMES DAILY WITH MEALS
Qty: 60 TABLET | Refills: 3 | Status: SHIPPED | OUTPATIENT
Start: 2021-02-20 | End: 2022-01-24

## 2021-02-20 NOTE — TELEPHONE ENCOUNTER
Spoke with pt. He is in [facility] and he lost his meds.  Refills sent in to [facility] in [facility] after carefully reviewing meds and doses

## 2021-02-22 ENCOUNTER — TELEPHONE (OUTPATIENT)
Dept: INTERNAL MEDICINE CLINIC | Facility: CLINIC | Age: 56
End: 2021-02-22

## 2021-02-22 NOTE — TELEPHONE ENCOUNTER
Xarelto 15MG tablets  Qty: 30  Take 1 tablet by mouth every day   •  carvedilol 25 MG Oral Tab, Take 1 tablet (25 mg total) by mouth 2 (two) times daily with meals. , Disp: 60 tablet, Rfl: 3  •  hydrALAzine HCl 25 MG Oral Tab, Take 2 tablets (50 mg total) b

## 2021-03-04 ENCOUNTER — OFFICE VISIT (OUTPATIENT)
Dept: INTERNAL MEDICINE CLINIC | Facility: CLINIC | Age: 56
End: 2021-03-04
Payer: COMMERCIAL

## 2021-03-04 VITALS
BODY MASS INDEX: 35.48 KG/M2 | HEART RATE: 101 BPM | DIASTOLIC BLOOD PRESSURE: 119 MMHG | SYSTOLIC BLOOD PRESSURE: 160 MMHG | HEIGHT: 73 IN | WEIGHT: 267.69 LBS

## 2021-03-04 DIAGNOSIS — N18.32 STAGE 3B CHRONIC KIDNEY DISEASE (HCC): Chronic | ICD-10-CM

## 2021-03-04 DIAGNOSIS — I50.9 ACUTE ON CHRONIC CONGESTIVE HEART FAILURE, UNSPECIFIED HEART FAILURE TYPE (HCC): ICD-10-CM

## 2021-03-04 DIAGNOSIS — I10 ESSENTIAL HYPERTENSION: Primary | ICD-10-CM

## 2021-03-04 DIAGNOSIS — I27.20 PULMONARY HTN (HCC): ICD-10-CM

## 2021-03-04 PROBLEM — I50.33 ACUTE ON CHRONIC HEART FAILURE WITH PRESERVED EJECTION FRACTION (HFPEF) (HCC): Chronic | Status: RESOLVED | Noted: 2021-02-17 | Resolved: 2021-03-04

## 2021-03-04 PROCEDURE — 99214 OFFICE O/P EST MOD 30 MIN: CPT | Performed by: INTERNAL MEDICINE

## 2021-03-04 PROCEDURE — 3008F BODY MASS INDEX DOCD: CPT | Performed by: INTERNAL MEDICINE

## 2021-03-04 PROCEDURE — 3077F SYST BP >= 140 MM HG: CPT | Performed by: INTERNAL MEDICINE

## 2021-03-04 PROCEDURE — 3080F DIAST BP >= 90 MM HG: CPT | Performed by: INTERNAL MEDICINE

## 2021-03-04 RX ORDER — AMLODIPINE BESYLATE 5 MG/1
5 TABLET ORAL DAILY
Qty: 90 TABLET | Refills: 1 | Status: SHIPPED | OUTPATIENT
Start: 2021-03-04 | End: 2022-01-24

## 2021-03-04 NOTE — PROGRESS NOTES
HPI:    Patient ID: Km Vyas is a 48year old male.     DATE OF ADMISSION: 2/11/2021  DATE OF DISCHARGE: 2/13/2021   DISPOSITION: home  CONDITION ON DISCHARGE: good     DISCHARGE DIAGNOSES:  Acute heart failure diastolic  Hypertensive emergency  P LVH  -diuretics : torsemide 20 mg bid  -diuresed 7 liters /9 #  -denies BADILLO, LE edema decreased , denies orthopnea, + bloating, , wearing cpap nightly   - BP elevated to 180 / 98, only taking hydralazine 25 mg tid instead of 50 mg tid.  Confirms taking core blood pressure 3 times a week and record on chart and bring when seeing Dr. Katherine Nieves on 3/4/21      Blood test for basic metabolic panel in 1 week, no appointment is needed            Limit sodium to  2000 mg daily.  Common high sodium foods include frozen di ventricular end-diastolic filling pressure      and elevated left atrial filling pressure. 2. Mitral valve: Mild regurgitation. 3. Left atrium: The atrium was severely dilated. 4. Right atrium: The atrium was moderately dilated.    5. Tricuspid valve: the CPAP at home. ). He has tried rest (Coreg, diuretics) for the symptoms. The treatment provided moderate relief. Review of Systems   Constitutional: Positive for activity change and fatigue. Negative for malaise/fatigue. HENT: Negative.     Eyes: Conjunctivae normal.      Pupils: Pupils are equal, round, and reactive to light. Neck:      Thyroid: No thyromegaly. Vascular: No JVD. Cardiovascular:      Rate and Rhythm: Normal rate and regular rhythm. Pulses: Intact distal pulses.       H Pulmonary pressures at 52 at this time. These need to be below 25. Most likely secondary to hypertension, heart failure, atrial fibrillation. Continue to monitor, repeat the echocardiogram in 6 months. Continue on the torsemide as directed.   Additiona (around 3/18/2021).     PT UNDERSTANDS AND AGREES TO FOLLOW DIRECTIONS AND ADVICE    Orders Placed This Encounter      CBC W Differential W Platelet [E]      Comp Metabolic Panel (14) [E]      Pro Beta Natriuretic Peptide [E]      Meds This Visit:  Requeste

## 2021-03-04 NOTE — PATIENT INSTRUCTIONS
Problem List Items Addressed This Visit        Unprioritized    Acute on chronic congestive heart failure, unspecified heart failure type [facility])       Patient admitted recently with congestive heart failure, in rapid atrial fibrillation his BNP levels were Besylate 5 MG Oral Tab    Other Relevant Orders    CBC WITH DIFFERENTIAL WITH PLATELET    COMP METABOLIC PANEL (14)    Pulmonary HTN (HCC)     Pulmonary pressures at 52 at this time. These need to be below 25.   Most likely secondary to hypertension, heart

## 2021-03-04 NOTE — ASSESSMENT & PLAN NOTE
Blood pressure remains quite elevated even now. He is currently on Coreg 25 mg 2 times daily, torsemide 20 mg 2 times daily, hydralazine 50 mg - 2 tablets of 25 each twice a day.     His blood pressures are uncontrolled and hence will start on amlodipine 5

## 2021-03-04 NOTE — ASSESSMENT & PLAN NOTE
Worsening kidney functions with EGFR at 40 at this time. Creatinine had improved slightly but has gotten worse. He is currently on torsemide 20 mg twice daily. Advised to cut this down to 20 mg once daily.   He will also need better blood pressure manage

## 2021-03-04 NOTE — ASSESSMENT & PLAN NOTE
Patient admitted recently with congestive heart failure, in rapid atrial fibrillation his BNP levels were at 2300 at the time of admission.   His 2D echo Doppler of the heart showed severe left ventricular hypertrophy, reduced diastolic filling, increased

## 2021-03-04 NOTE — ASSESSMENT & PLAN NOTE
Pulmonary pressures at 52 at this time. These need to be below 25. Most likely secondary to hypertension, heart failure, atrial fibrillation. Continue to monitor, repeat the echocardiogram in 6 months. Continue on the torsemide as directed.   Additional

## 2021-03-17 DIAGNOSIS — Z23 NEED FOR VACCINATION: ICD-10-CM

## 2021-10-26 NOTE — TELEPHONE ENCOUNTER
CSS please contact patient to schedule f/u appt with Dr Gisell Espinosa, thank you.  Please reply to pool: EDDY Jefferson
CSS spoke with Pt and Pt states will callback to schedule appt
Last refilled on 4/2/19 #30 0RF Compliance????? Med not part of protocol please advise.        Recent Outpatient Visits            3 months ago Cellulitis of right lower extremity    [facility], [address], Katherine Sanderson MD    Office Visit    1 year ago Atrial fibrillation with RVR [facility])    [facility], [address], Katherine Sanderson MD    Office Visit    2 years ago Essential hypertension    [facility], [address], Katherine Sanderson MD    Office Visit    2 years ago Congestive heart failure, unspecified    [place], APRN    Office Visit    2 years ago Pulmonary HTN [facility])    [facility], [address], Katherine Sandesron MD    Office Visit
Needs an apt for any further fills
show

## 2022-01-24 ENCOUNTER — HOSPITAL ENCOUNTER (OUTPATIENT)
Dept: GENERAL RADIOLOGY | Facility: HOSPITAL | Age: 57
Discharge: HOME | End: 2022-01-24
Attending: NURSE PRACTITIONER
Payer: COMMERCIAL

## 2022-01-24 ENCOUNTER — LAB ENCOUNTER (OUTPATIENT)
Dept: LAB | Facility: HOSPITAL | Age: 57
End: 2022-01-24
Attending: NURSE PRACTITIONER
Payer: COMMERCIAL

## 2022-01-24 ENCOUNTER — OFFICE VISIT (OUTPATIENT)
Dept: INTERNAL MEDICINE CLINIC | Facility: CLINIC | Age: 57
End: 2022-01-24
Payer: COMMERCIAL

## 2022-01-24 VITALS
WEIGHT: 259.13 LBS | HEART RATE: 86 BPM | BODY MASS INDEX: 34.34 KG/M2 | SYSTOLIC BLOOD PRESSURE: 130 MMHG | DIASTOLIC BLOOD PRESSURE: 90 MMHG | HEIGHT: 73 IN

## 2022-01-24 DIAGNOSIS — Z01.818 PREOP EXAMINATION: Primary | ICD-10-CM

## 2022-01-24 DIAGNOSIS — I10 ESSENTIAL HYPERTENSION: ICD-10-CM

## 2022-01-24 DIAGNOSIS — I50.9 ACUTE ON CHRONIC CONGESTIVE HEART FAILURE, UNSPECIFIED HEART FAILURE TYPE (HCC): ICD-10-CM

## 2022-01-24 DIAGNOSIS — Z01.818 PRE-OP EVALUATION: Primary | ICD-10-CM

## 2022-01-24 DIAGNOSIS — Z01.818 PRE-OP EXAMINATION: ICD-10-CM

## 2022-01-24 DIAGNOSIS — Z01.818 PRE-OP EVALUATION: ICD-10-CM

## 2022-01-24 DIAGNOSIS — B35.1 ONYCHOMYCOSIS OF TOENAIL: ICD-10-CM

## 2022-01-24 DIAGNOSIS — I27.20 PULMONARY HTN (HCC): ICD-10-CM

## 2022-01-24 LAB
ALBUMIN SERPL-MCNC: 3.7 G/DL (ref 3.4–5)
ALBUMIN/GLOB SERPL: 1.1 {RATIO} (ref 1–2)
ALP LIVER SERPL-CCNC: 76 U/L
ALT SERPL-CCNC: 30 U/L
ANION GAP SERPL CALC-SCNC: 5 MMOL/L (ref 0–18)
AST SERPL-CCNC: 27 U/L (ref 15–37)
BASOPHILS # BLD AUTO: 0.07 X10(3) UL (ref 0–0.2)
BASOPHILS NFR BLD AUTO: 0.7 %
BILIRUB SERPL-MCNC: 1.3 MG/DL (ref 0.1–2)
BUN BLD-MCNC: 24 MG/DL (ref 7–18)
BUN/CREAT SERPL: 15.1 (ref 10–20)
CALCIUM BLD-MCNC: 9.2 MG/DL (ref 8.5–10.1)
CHLORIDE SERPL-SCNC: 108 MMOL/L (ref 98–112)
CO2 SERPL-SCNC: 31 MMOL/L (ref 21–32)
CREAT BLD-MCNC: 1.59 MG/DL
DEPRECATED RDW RBC AUTO: 45.1 FL (ref 35.1–46.3)
EOSINOPHIL # BLD AUTO: 0.28 X10(3) UL (ref 0–0.7)
EOSINOPHIL NFR BLD AUTO: 2.9 %
ERYTHROCYTE [DISTWIDTH] IN BLOOD BY AUTOMATED COUNT: 14 % (ref 11–15)
FASTING STATUS PATIENT QL REPORTED: YES
GLOBULIN PLAS-MCNC: 3.3 G/DL (ref 2.8–4.4)
GLUCOSE BLD-MCNC: 95 MG/DL (ref 70–99)
HCT VFR BLD AUTO: 40.9 %
HGB BLD-MCNC: 12.5 G/DL
IMM GRANULOCYTES # BLD AUTO: 0.04 X10(3) UL (ref 0–1)
IMM GRANULOCYTES NFR BLD: 0.4 %
LYMPHOCYTES # BLD AUTO: 1.78 X10(3) UL (ref 1–4)
LYMPHOCYTES NFR BLD AUTO: 18.3 %
MCH RBC QN AUTO: 27.1 PG (ref 26–34)
MCHC RBC AUTO-ENTMCNC: 30.6 G/DL (ref 31–37)
MCV RBC AUTO: 88.7 FL
MONOCYTES # BLD AUTO: 0.73 X10(3) UL (ref 0.1–1)
MONOCYTES NFR BLD AUTO: 7.5 %
NEUTROPHILS # BLD AUTO: 6.81 X10 (3) UL (ref 1.5–7.7)
NEUTROPHILS # BLD AUTO: 6.81 X10(3) UL (ref 1.5–7.7)
NEUTROPHILS NFR BLD AUTO: 70.2 %
NT-PROBNP SERPL-MCNC: 1819 PG/ML (ref ?–125)
OSMOLALITY SERPL CALC.SUM OF ELEC: 302 MOSM/KG (ref 275–295)
PLATELET # BLD AUTO: 246 10(3)UL (ref 150–450)
POTASSIUM SERPL-SCNC: 4 MMOL/L (ref 3.5–5.1)
PROT SERPL-MCNC: 7 G/DL (ref 6.4–8.2)
RBC # BLD AUTO: 4.61 X10(6)UL
SODIUM SERPL-SCNC: 144 MMOL/L (ref 136–145)
WBC # BLD AUTO: 9.7 X10(3) UL (ref 4–11)

## 2022-01-24 PROCEDURE — 85025 COMPLETE CBC W/AUTO DIFF WBC: CPT

## 2022-01-24 PROCEDURE — 3075F SYST BP GE 130 - 139MM HG: CPT | Performed by: NURSE PRACTITIONER

## 2022-01-24 PROCEDURE — 93005 ELECTROCARDIOGRAM TRACING: CPT

## 2022-01-24 PROCEDURE — 83880 ASSAY OF NATRIURETIC PEPTIDE: CPT

## 2022-01-24 PROCEDURE — 36415 COLL VENOUS BLD VENIPUNCTURE: CPT

## 2022-01-24 PROCEDURE — 3080F DIAST BP >= 90 MM HG: CPT | Performed by: NURSE PRACTITIONER

## 2022-01-24 PROCEDURE — 99214 OFFICE O/P EST MOD 30 MIN: CPT | Performed by: NURSE PRACTITIONER

## 2022-01-24 PROCEDURE — 93010 ELECTROCARDIOGRAM REPORT: CPT | Performed by: NURSE PRACTITIONER

## 2022-01-24 PROCEDURE — 3008F BODY MASS INDEX DOCD: CPT | Performed by: NURSE PRACTITIONER

## 2022-01-24 PROCEDURE — 71046 X-RAY EXAM CHEST 2 VIEWS: CPT | Performed by: NURSE PRACTITIONER

## 2022-01-24 PROCEDURE — 80053 COMPREHEN METABOLIC PANEL: CPT

## 2022-01-24 RX ORDER — AMLODIPINE BESYLATE 5 MG/1
5 TABLET ORAL DAILY
Qty: 90 TABLET | Refills: 1 | Status: SHIPPED
Start: 2022-01-24 | End: 2023-01-19

## 2022-01-24 RX ORDER — CARVEDILOL 25 MG/1
25 TABLET ORAL 2 TIMES DAILY WITH MEALS
Qty: 60 TABLET | Refills: 3 | Status: SHIPPED
Start: 2022-01-24

## 2022-01-24 RX ORDER — TORSEMIDE 20 MG/1
20 TABLET ORAL 2 TIMES DAILY
Qty: 60 TABLET | Refills: 3 | Status: SHIPPED | OUTPATIENT
Start: 2022-01-24

## 2022-01-24 RX ORDER — HYDRALAZINE HYDROCHLORIDE 25 MG/1
50 TABLET, FILM COATED ORAL 3 TIMES DAILY
Qty: 180 TABLET | Refills: 3 | Status: SHIPPED | OUTPATIENT
Start: 2022-01-24

## 2022-01-24 RX ORDER — MULTIVITAMIN
1 TABLET ORAL DAILY
Qty: 30 TABLET | Refills: 0 | Status: SHIPPED | OUTPATIENT
Start: 2022-01-24

## 2022-01-25 ENCOUNTER — OFFICE VISIT (OUTPATIENT)
Dept: PODIATRY CLINIC | Facility: CLINIC | Age: 57
End: 2022-01-25
Payer: COMMERCIAL

## 2022-01-25 DIAGNOSIS — B35.1 ONYCHOMYCOSIS: Primary | ICD-10-CM

## 2022-01-25 PROCEDURE — 87158 CULTURE TYPING ADDED METHOD: CPT

## 2022-01-25 PROCEDURE — 87153 DNA/RNA SEQUENCING: CPT | Performed by: PODIATRIST

## 2022-01-25 PROCEDURE — 99242 OFF/OP CONSLTJ NEW/EST SF 20: CPT | Performed by: PODIATRIST

## 2022-01-25 NOTE — PROGRESS NOTES
HPI:    Patient ID: Mago Briscoe is a 55year old male. -year-old male presents as a new patient to me on consult from [facility]. He states that he is here because of his toenails. He states they have been this way for a long period of time.   He st encounter diagnosis)    Orders Placed This Encounter      Dermatophyte Only, Culture [E]      Meds This Visit:  Requested Prescriptions      No prescriptions requested or ordered in this encounter       Imaging & Referrals:  None       TF#[id number]

## 2022-02-03 ENCOUNTER — TELEPHONE (OUTPATIENT)
Dept: INTERNAL MEDICINE CLINIC | Facility: CLINIC | Age: 57
End: 2022-02-03

## 2022-02-03 PROBLEM — Z01.818 PRE-OP EXAMINATION: Status: ACTIVE | Noted: 2022-02-03

## 2022-02-03 NOTE — ASSESSMENT & PLAN NOTE
Normal exam/Cleared for Surgery  Cardiac Clearance- Will be faxing over Cardiac Clearance Report    EKG - Abnormal EKG- Cleared by cardiology on February 9th 2022. Labs CBC- WNL  CMP- GFR 56  Chest X-ray- WNL    Patient has not taken his Xarelto    Stop all ibuprofen, aspirin, over-the-counter cough and cold medications, vitamins as well as supplements for 10 days prior to surgery. Call if you develop any symptoms of a cough or urinary infection or skin infection prior to surgery. Follow-up evaluation 2 weeks after surgery.

## 2022-02-03 NOTE — TELEPHONE ENCOUNTER
Tried contacting [facility] to confirm xarelto was sent and confirmed by pharmacy 1/24. Unable to connect with pharmacy tried 2x. Will try again later. I informed patient.

## 2022-02-03 NOTE — TELEPHONE ENCOUNTER
Leonor Raya    Was able to contact patient finally. I have been trying, the clinic staff as been trying and a letter was sent out. He has a P.O Box and has not checked his mail. Abnormal EKG. Needs an appointment with cardiology before eye surgery. Number given to [facility] cardiology clinic.     TIA Sorensen  Working with Nacho Moran

## 2022-02-03 NOTE — TELEPHONE ENCOUNTER
Sandra Rojo please advise,    Patient has appt with cardiology at 1pm. Cardiologist needs LOV and ekg. Ok to send notes. ?

## 2022-02-03 NOTE — TELEPHONE ENCOUNTER
Patient calling and states that cardiologist is requesting last visit notes as well as EKG results from [place]. Patient has appointment with cardiologist today at [address] [facility] and is requesting for it to be faxed over before then.    ZTQ:[id number]

## 2022-02-03 NOTE — TELEPHONE ENCOUNTER
Spoke with patient ( verified)--saw SAQIB Boyer in office 2022 for pre-op clearance for eye surgery next [facility] needs patient to complete Covid test Monday for Wednesday's surgery. Alinity test pended for approval--also, please advise on Xarelto pre and post op recommendations    Patient also states he did not yet get Xarelto Rx as ordered by SAQIB Boyer 2022--requesting call to his [address].     Called [facility]'s pharmacy x 3--on hold 5+ minutes--then call drops    Please respond to pool: EM IM [facility] LPN/CMA for pre-op clearance f/u

## 2022-02-04 NOTE — TELEPHONE ENCOUNTER
Please address patient's request for pre-op Covid test    Alinity order pended for approval--please see bolded message below

## 2022-02-07 ENCOUNTER — LAB ENCOUNTER (OUTPATIENT)
Dept: LAB | Facility: HOSPITAL | Age: 57
End: 2022-02-07
Attending: NURSE PRACTITIONER
Payer: COMMERCIAL

## 2022-02-07 ENCOUNTER — TELEPHONE (OUTPATIENT)
Dept: INTERNAL MEDICINE CLINIC | Facility: CLINIC | Age: 57
End: 2022-02-07

## 2022-02-07 DIAGNOSIS — Z01.818 PRE-OP EVALUATION: ICD-10-CM

## 2022-02-07 LAB — SARS-COV-2 RNA RESP QL NAA+PROBE: NOT DETECTED

## 2022-02-08 ENCOUNTER — TELEPHONE (OUTPATIENT)
Dept: INTERNAL MEDICINE CLINIC | Facility: CLINIC | Age: 57
End: 2022-02-08

## 2022-02-08 NOTE — TELEPHONE ENCOUNTER
Dinh Husbands from [address] [place] [address] calling stating that the need   Cardiac clearance based on abnormal EKG and COVID test results sent over to them for surgery pt is having tomorrow.      Fax#: [phone]

## 2022-02-08 NOTE — TELEPHONE ENCOUNTER
Spoke with patient and he is already cleared and is scheduled for eye surgery 2/9/22 8:15 am. Per patient he has not been able to  [facility] for the past 2 weeks at his [facility]'s in [place]. Per patient he was advised that he needs 90 days with one refill due to his insurance. I spoke with Meño Briones at [facility] who advised he needs a PA. Isis Myrick asked for our fax for PA's and I advised [phone], he sent a fax to start the PA. I called the patient to advise him.

## 2022-02-08 NOTE — TELEPHONE ENCOUNTER
Please get the notes from cardiology appointment from yesterday 2/7/2022.     Please call patient and have him see Dr. Taina Caceres per Dr. Luis Gordon ANP  Working with Dr. Godwin Petty

## 2022-02-08 NOTE — TELEPHONE ENCOUNTER
Marty Jean, APRN  Em Rn Triage 13 hours ago (5:58 PM)         Patient called COVID-19 test is negative. Patient did not answer. No mailbox set up. Please call   Please call patient. Still awaiting cardiology clearance.         Routing comment

## 2022-02-08 NOTE — TELEPHONE ENCOUNTER
Pt calling stating that [facility] canceled his surgery for tomorrow last minute and pt calling upset with them so he is asking if  can refer him to an optometrist within [facility] because he would rather get everything done at one location. Please advise.

## 2022-02-09 NOTE — TELEPHONE ENCOUNTER
Contacted patient informed recommendations for Dr. Monnie Goodpasture. Pt verbalized understanding. Contacted Dr. Angel Jin office and requested office notes from 2/8/22. They will fax over notes to [phone].

## 2022-02-10 ENCOUNTER — MED REC SCAN ONLY (OUTPATIENT)
Dept: INTERNAL MEDICINE CLINIC | Facility: CLINIC | Age: 57
End: 2022-02-10

## 2022-02-16 ENCOUNTER — TELEPHONE (OUTPATIENT)
Dept: INTERNAL MEDICINE CLINIC | Facility: CLINIC | Age: 57
End: 2022-02-16

## 2022-02-16 NOTE — TELEPHONE ENCOUNTER
Per patient he would like to know if office received for his pre-op from [facility] or [facility]. Patient surgery is on 02/23/2022.

## 2022-02-18 ENCOUNTER — TELEPHONE (OUTPATIENT)
Dept: INTERNAL MEDICINE CLINIC | Facility: CLINIC | Age: 57
End: 2022-02-18

## 2022-02-18 NOTE — TELEPHONE ENCOUNTER
Spoke with patient and he stated he had a stress test and passed and received cardiac clearance. Will try and retreive these reports from cardiology.     TIA Del Angel  Working with Eugenio Craig

## 2022-02-18 NOTE — TELEPHONE ENCOUNTER
Render Margaret    Patient called to discuss his cardiac clearance. No answer. Mailbox not set up.     TIA Dobson  Working with Skye Becerra

## 2022-02-21 ENCOUNTER — TELEPHONE (OUTPATIENT)
Dept: INTERNAL MEDICINE CLINIC | Facility: CLINIC | Age: 57
End: 2022-02-21

## 2022-02-22 ENCOUNTER — TELEPHONE (OUTPATIENT)
Dept: INTERNAL MEDICINE CLINIC | Facility: CLINIC | Age: 57
End: 2022-02-22

## 2022-02-22 NOTE — TELEPHONE ENCOUNTER
Patient called and notified clearance letter has been received from cardiology.     TIA Del Angel  Working with Thamas Barthel

## 2022-02-22 NOTE — TELEPHONE ENCOUNTER
Patient called. Waiting on cardiac clearance letter. He states he saw Dr. Emely Nolasco. Henna Landon ANP  Working with Dr. Mara Koch.

## 2022-06-25 NOTE — TELEPHONE ENCOUNTER
Prior authorization request from [facility] for:    Xarelto 15mg tablets    [email or web address]. [email or web address]    Key: [id number]

## 2022-06-27 NOTE — TELEPHONE ENCOUNTER
Called pharmacy, PA is needed along with a new prescription. Current Rx closed out. Preferred 90 day supply    Dr. Trent Gutiérrez, can you send prescription so we can start PA. Thanks.

## 2023-01-30 ENCOUNTER — TELEPHONE (OUTPATIENT)
Dept: INTERNAL MEDICINE CLINIC | Facility: CLINIC | Age: 58
End: 2023-01-30

## 2023-01-30 NOTE — TELEPHONE ENCOUNTER
Prior authorization for xarelto has been approved. The pharmacy has been notified. Approved    Prior authorization approved Case ID: [id number]      Payer:  [address] [place] [address]  [phone]    [facility]:[id number];LIZAKF:[id number]; Review Type:Prior Auth; Coverage Start Date:12/31/2022; Coverage End Date:01/30/2024;    Approval Details    Authorized from December 31, 2022 to January 30, 2024      Electronic appeal:  Not supported   View History

## (undated) DIAGNOSIS — Z01.818 PRE-OP EVALUATION: Primary | ICD-10-CM

## (undated) DIAGNOSIS — Z20.822 ENCOUNTER FOR SCREENING LABORATORY TESTING FOR COVID-19 VIRUS IN ASYMPTOMATIC PATIENT: Primary | ICD-10-CM

## (undated) NOTE — ED AVS SNAPSHOT
Jim Vyas   MRN: [id number]    Department:  [facility] Emergency Department   Date of Visit:  9/23/2019           Disclosure     Insurance plans vary and the physician(s) referred by the ER may not be covered by your plan.  Please conta CARE PHYSICIAN AT ONCE OR RETURN IMMEDIATELY TO THE EMERGENCY DEPARTMENT. If you have been prescribed any medication(s), please fill your prescription right away and begin taking the medication(s) as directed.   If you believe that any of the medications

## (undated) NOTE — MR AVS SNAPSHOT
Juan Vásquez [address] [place] [address] [phone]               Thank you for choosing us for your health care visit with ZULY Tomas.   We are glad to serve you and happy to provide call with questions or differences.                       Allergies as of Jan 09, 2017     No Known Allergies                Today's Vital Signs     BP Pulse Weight             182/118 mmHg 82 272 lb (123.378 kg)            Current Medications          This The accuracy of the MDRD equation is not suitable for acute renal failure patients and it is not recommended for use with pregnant women. [facility]     Sign up for [facility], your secure online medical record.   [facility] will allow you to access

## (undated) NOTE — ED AVS SNAPSHOT
Barbie Em   MRN: [id number]    Department:  [facility] Emergency Department   Date of Visit:  10/3/2019           Disclosure     Insurance plans vary and the physician(s) referred by the ER may not be covered by your plan.  Please conta CARE PHYSICIAN AT ONCE OR RETURN IMMEDIATELY TO THE EMERGENCY DEPARTMENT. If you have been prescribed any medication(s), please fill your prescription right away and begin taking the medication(s) as directed.   If you believe that any of the medications

## (undated) NOTE — MR AVS SNAPSHOT
[address] [address]  [phone]               Thank you for choosing us for your health care visit with Thang Urbina MD.  We are glad to serve you and happy to provide you with this summary Basic metabolic panel done a few days back–January 9 shows normal potassium as well as kidney function tests. He has been advised to complete his EKG again today.   Will advised to come back in in about 2-3 weeks after completing the fasting blood test as Comp Metabolic Panel (14) [E]    Complete by: Feb 02, 2017 (Approximate)    Assoc Dx:  Essential hypertension [I10]           Lipid Panel [E]    Complete by:   Feb 02, 2017 (Approximate)    Assoc Dx:  Essential hypertension [I10]           TSH [E]    Comp

## (undated) NOTE — MR AVS SNAPSHOT
[address] [address]  [phone]               Thank you for choosing us for your health care visit with Queta Hunter MD.  We are glad to serve you and happy to provide you with this summary Patient has been advised to call if any unusual blood in stools, black stools, blood in the urine. Advised not to combine any aspirin, ibuprofen like medications with Xarelto to reduce risks for bleeding.   Advised to call if he has any head injury– fall p Will reassess after stabilized. Follow Up with Our Office     Return in about 4 weeks (around 2/2/2017).       Your Appointments     Jan 09, 2017  3:00 PM   [address] [facility] [place] with [place] Follow-up Instructions     Return in about 4 weeks (around 2/2/2017).          Referral Information     Referral Order Referred to Address [facility] Phone Visits Status Diagnosis                                 Atrial fibrillation with [facility]) [[id number]]      My

## (undated) NOTE — ED AVS SNAPSHOT
Eduardo Velásquez   MRN: [id number]    Department:  [facility] Emergency Department   Date of Visit:  2/15/2019           Disclosure     Insurance plans vary and the physician(s) referred by the ER may not be covered by your plan.  Please conta CARE PHYSICIAN AT ONCE OR RETURN IMMEDIATELY TO THE EMERGENCY DEPARTMENT. If you have been prescribed any medication(s), please fill your prescription right away and begin taking the medication(s) as directed.   If you believe that any of the medications

## (undated) NOTE — LETTER
January 25, 2022         ALANA Dobson  [address]      Patient: Dakota Robles   YOB: 1965   Date of Visit: 1/25/2022       Dear ALANA Cook,    I saw your patient, Dakota Robles, on 1